# Patient Record
Sex: FEMALE | Race: BLACK OR AFRICAN AMERICAN | Employment: UNEMPLOYED | URBAN - METROPOLITAN AREA
[De-identification: names, ages, dates, MRNs, and addresses within clinical notes are randomized per-mention and may not be internally consistent; named-entity substitution may affect disease eponyms.]

---

## 2019-06-05 ENCOUNTER — APPOINTMENT (OUTPATIENT)
Dept: GENERAL RADIOLOGY | Age: 19
DRG: 566 | End: 2019-06-05
Payer: MEDICAID

## 2019-06-05 ENCOUNTER — HOSPITAL ENCOUNTER (INPATIENT)
Age: 19
LOS: 1 days | Discharge: HOME | DRG: 566 | End: 2019-06-07
Attending: EMERGENCY MEDICINE | Admitting: PODIATRIST
Payer: MEDICAID
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
## 2019-06-05 DIAGNOSIS — Z33.1 PREGNANCY AS INCIDENTAL FINDING: ICD-10-CM

## 2019-06-05 DIAGNOSIS — S81.812A LACERATION OF LEFT LOWER EXTREMITY, INITIAL ENCOUNTER: Primary | ICD-10-CM

## 2019-06-05 DIAGNOSIS — S91.059A DOG BITE OF ANKLE, INITIAL ENCOUNTER: ICD-10-CM

## 2019-06-05 LAB
A/G RATIO: 1.3 (ref 1.1–2.2)
ALBUMIN SERPL-MCNC: 3.9 G/DL (ref 3.4–5)
ALP BLD-CCNC: 60 U/L (ref 40–129)
ALT SERPL-CCNC: 14 U/L (ref 10–40)
ANION GAP SERPL CALCULATED.3IONS-SCNC: 12 MMOL/L (ref 3–16)
AST SERPL-CCNC: 16 U/L (ref 15–37)
BASOPHILS ABSOLUTE: 0 K/UL (ref 0–0.2)
BASOPHILS RELATIVE PERCENT: 0.1 %
BILIRUB SERPL-MCNC: <0.2 MG/DL (ref 0–1)
BUN BLDV-MCNC: 6 MG/DL (ref 7–20)
CALCIUM SERPL-MCNC: 9.5 MG/DL (ref 8.3–10.6)
CHLORIDE BLD-SCNC: 106 MMOL/L (ref 99–110)
CO2: 21 MMOL/L (ref 21–32)
CREAT SERPL-MCNC: <0.5 MG/DL (ref 0.6–1.1)
EOSINOPHILS ABSOLUTE: 0 K/UL (ref 0–0.6)
EOSINOPHILS RELATIVE PERCENT: 0.2 %
GFR AFRICAN AMERICAN: >60
GFR NON-AFRICAN AMERICAN: >60
GLOBULIN: 3 G/DL
GLUCOSE BLD-MCNC: 99 MG/DL (ref 70–99)
GONADOTROPIN, CHORIONIC (HCG) QUANT: NORMAL MIU/ML
HCG QUALITATIVE: POSITIVE
HCT VFR BLD CALC: 37.5 % (ref 36–48)
HEMOGLOBIN: 12.3 G/DL (ref 12–16)
LYMPHOCYTES ABSOLUTE: 1.6 K/UL (ref 1–5.1)
LYMPHOCYTES RELATIVE PERCENT: 10.7 %
MCH RBC QN AUTO: 28.2 PG (ref 26–34)
MCHC RBC AUTO-ENTMCNC: 32.8 G/DL (ref 31–36)
MCV RBC AUTO: 86.1 FL (ref 80–100)
MONOCYTES ABSOLUTE: 0.6 K/UL (ref 0–1.3)
MONOCYTES RELATIVE PERCENT: 3.9 %
NEUTROPHILS ABSOLUTE: 12.6 K/UL (ref 1.7–7.7)
NEUTROPHILS RELATIVE PERCENT: 85.1 %
PDW BLD-RTO: 16.9 % (ref 12.4–15.4)
PLATELET # BLD: 240 K/UL (ref 135–450)
PMV BLD AUTO: 9.1 FL (ref 5–10.5)
POTASSIUM REFLEX MAGNESIUM: 4.1 MMOL/L (ref 3.5–5.1)
RBC # BLD: 4.36 M/UL (ref 4–5.2)
SODIUM BLD-SCNC: 139 MMOL/L (ref 136–145)
TOTAL PROTEIN: 6.9 G/DL (ref 6.4–8.2)
WBC # BLD: 14.8 K/UL (ref 4–11)

## 2019-06-05 PROCEDURE — 80053 COMPREHEN METABOLIC PANEL: CPT

## 2019-06-05 PROCEDURE — 96361 HYDRATE IV INFUSION ADD-ON: CPT

## 2019-06-05 PROCEDURE — 2580000003 HC RX 258

## 2019-06-05 PROCEDURE — 96376 TX/PRO/DX INJ SAME DRUG ADON: CPT

## 2019-06-05 PROCEDURE — 85025 COMPLETE CBC W/AUTO DIFF WBC: CPT

## 2019-06-05 PROCEDURE — 6360000002 HC RX W HCPCS: Performed by: STUDENT IN AN ORGANIZED HEALTH CARE EDUCATION/TRAINING PROGRAM

## 2019-06-05 PROCEDURE — 73610 X-RAY EXAM OF ANKLE: CPT

## 2019-06-05 PROCEDURE — 99284 EMERGENCY DEPT VISIT MOD MDM: CPT

## 2019-06-05 PROCEDURE — 2580000003 HC RX 258: Performed by: STUDENT IN AN ORGANIZED HEALTH CARE EDUCATION/TRAINING PROGRAM

## 2019-06-05 PROCEDURE — 96365 THER/PROPH/DIAG IV INF INIT: CPT

## 2019-06-05 PROCEDURE — 84702 CHORIONIC GONADOTROPIN TEST: CPT

## 2019-06-05 PROCEDURE — 84703 CHORIONIC GONADOTROPIN ASSAY: CPT

## 2019-06-05 PROCEDURE — 6360000002 HC RX W HCPCS: Performed by: EMERGENCY MEDICINE

## 2019-06-05 PROCEDURE — 2580000003 HC RX 258: Performed by: EMERGENCY MEDICINE

## 2019-06-05 PROCEDURE — 96375 TX/PRO/DX INJ NEW DRUG ADDON: CPT

## 2019-06-05 RX ORDER — FENTANYL CITRATE 50 UG/ML
50 INJECTION, SOLUTION INTRAMUSCULAR; INTRAVENOUS
Status: COMPLETED | OUTPATIENT
Start: 2019-06-05 | End: 2019-06-05

## 2019-06-05 RX ORDER — SODIUM CHLORIDE 9 MG/ML
INJECTION, SOLUTION INTRAVENOUS
Status: COMPLETED
Start: 2019-06-05 | End: 2019-06-05

## 2019-06-05 RX ORDER — MORPHINE SULFATE 4 MG/ML
4 INJECTION, SOLUTION INTRAMUSCULAR; INTRAVENOUS ONCE
Status: COMPLETED | OUTPATIENT
Start: 2019-06-05 | End: 2019-06-05

## 2019-06-05 RX ORDER — 0.9 % SODIUM CHLORIDE 0.9 %
2000 INTRAVENOUS SOLUTION INTRAVENOUS ONCE
Status: COMPLETED | OUTPATIENT
Start: 2019-06-05 | End: 2019-06-05

## 2019-06-05 RX ADMIN — AMPICILLIN SODIUM AND SULBACTAM SODIUM 3 G: 2; 1 INJECTION, POWDER, FOR SOLUTION INTRAMUSCULAR; INTRAVENOUS at 20:19

## 2019-06-05 RX ADMIN — SODIUM CHLORIDE 250 ML: 9 INJECTION, SOLUTION INTRAVENOUS at 23:22

## 2019-06-05 RX ADMIN — FENTANYL CITRATE 50 MCG: 50 INJECTION INTRAMUSCULAR; INTRAVENOUS at 19:40

## 2019-06-05 RX ADMIN — MORPHINE SULFATE 4 MG: 4 INJECTION, SOLUTION INTRAMUSCULAR; INTRAVENOUS at 23:22

## 2019-06-05 RX ADMIN — PIPERACILLIN SODIUM,TAZOBACTAM SODIUM 3.38 G: 3; .375 INJECTION, POWDER, FOR SOLUTION INTRAVENOUS at 23:21

## 2019-06-05 RX ADMIN — FENTANYL CITRATE 50 MCG: 50 INJECTION INTRAMUSCULAR; INTRAVENOUS at 21:22

## 2019-06-05 RX ADMIN — SODIUM CHLORIDE 2000 ML: 9 INJECTION, SOLUTION INTRAVENOUS at 19:39

## 2019-06-05 ASSESSMENT — PAIN DESCRIPTION - LOCATION
LOCATION: LEG
LOCATION: LEG

## 2019-06-05 ASSESSMENT — PAIN SCALES - GENERAL
PAINLEVEL_OUTOF10: 10
PAINLEVEL_OUTOF10: 6
PAINLEVEL_OUTOF10: 10

## 2019-06-05 ASSESSMENT — PAIN DESCRIPTION - PAIN TYPE
TYPE: ACUTE PAIN
TYPE: ACUTE PAIN

## 2019-06-05 ASSESSMENT — PAIN DESCRIPTION - ORIENTATION
ORIENTATION: LEFT;LOWER
ORIENTATION: LOWER;LEFT

## 2019-06-05 ASSESSMENT — PAIN DESCRIPTION - FREQUENCY: FREQUENCY: CONTINUOUS

## 2019-06-05 NOTE — ED PROVIDER NOTES
Emergency Physician Note    Chief Complaint  Animal Bite       History of Present Illness  Jose Dugan is a 19 y.o. female who presents to the ED for left ankle animal bite. Patient was attacked by a large dog, she does not know the owner of the dog and she can just give me the prescription or the dog other than that it is very large. Initially she was being brought by ambulance but she did not like the way they were treating her and she refused the IV placement and so she decided to go home and wait for her mom. Her mom ended up bringing her to the ER. Therefore the bite occurred approximately 3 hours prior to arrival.  Patient states she is having this significant sensation loss to her left foot she cannot move her left foot and she cannot tell me if this because it too painful and she does not physically able to move her left foot. Patient is on the Depo-Provera shot. Denies fever, chills, malaise, chest pain, shortness of breath, cough, abdominal pain, nausea, vomiting, diarrhea, headache, sore throat, dysuria, back pain, rash. No palliative/provocative factors. Unless otherwise stated in this report or unable to obtain because of the patient's clinical or mental status as evidenced by the medical record, this patient's positive and negative responses for review of systems, constitutional, psych, eyes, ENT, cardiovascular, respiratory, gastrointestinal, neurological, genitourinary, musculoskeletal, integument systems and systems related to the presenting problem are either stated in the preceding paragraph or were not pertinent or were negative for the symptoms and/or complaints related to the medical problem. I have reviewed the following from the nursing documentation:      Prior to Admission medications    Medication Sig Start Date End Date Taking?  Authorizing Provider   ondansetron (ZOFRAN ODT) 4 MG disintegrating tablet Take 1 tablet by mouth every 8 hours as needed for Nausea 3/1/18 reviewed. ED Triage Vitals [06/05/19 1856]   Enc Vitals Group      BP (!) 116/57      Pulse 107      Resp 18      Temp 98.9 °F (37.2 °C)      Temp Source Oral      SpO2 98 %      Weight 168 lb (76.2 kg)      Height 5' 3\" (1.6 m)      Head Circumference       Peak Flow       Pain Score       Pain Loc       Pain Edu? Excl. in [address]? GENERAL:  Awake, alert. Well developed, well nourished with apparent distress. HENT:  Normocephalic, Atraumatic, no lacerations. EYES:  Conjunctiva normal, Pupils equal round and reactive to light, extraocular movements normal.  NECK:  Trachea is midline. No stridor. CHEST:  Regular rate and regular rhythm, no murmurs/rubs/gallops, normal S1/S2, chest wall non-tender. LUNGS:  No respiratory distress. No abdominal retractions, no sternal retractions. Clear to auscultation bilaterally, no wheezing, no rhochi, no rales  ABDOMEN:  Soft, non-tender, non-distended. No guarding and no rebound. No costovertebral angle tenderness to palpation. Normal BS, no organomegaly, no abdominal masses  EXTREMITIES:  Bilateral upper extremity and right lower extremity: Normal range of motion, no edema, no tenderness, no deformity, distal pulses present and equal bilaterally. Moves extremities with purpose. Left lower extremity, there are 2 large lacerations that are more than skin deep possibly affecting the muscle and the tendon, unable to get a good view of the tendon secondary to pain in the patient's refusal to cooperate. The 2 lacerations are nearly circumferential on the lower distal portion of the leg. She does have good pedal pulses in her left foot                    NEUROLOGIC: Normal mental status. Moving all extremities to command. Alert and oriented x4 without focal motor deficit or gross sensory deficit. Normal speech. Decreased sensation in the left foot.     PSYCHIATRIC: anxious, normal mood and affect, thoughts are linear and organized, without delusions/hallucinations, responds appropriately to questions      LABS and DIAGNOSTIC RESULTS    RADIOLOGY  X-RAYS:  I have reviewed radiologic plain film image(s). ALL OTHER NON-PLAIN FILM IMAGES SUCH AS CT, ULTRASOUND AND MRI HAVE BEEN READ BY THE RADIOLOGIST.   XR ANKLE LEFT (2 VIEWS)    (Results Pending)        LABS  Results for orders placed or performed during the hospital encounter of 06/05/19   CBC Auto Differential   Result Value Ref Range    WBC 14.8 (H) 4.0 - 11.0 K/uL    RBC 4.36 4.00 - 5.20 M/uL    Hemoglobin 12.3 12.0 - 16.0 g/dL    Hematocrit 37.5 36.0 - 48.0 %    MCV 86.1 80.0 - 100.0 fL    MCH 28.2 26.0 - 34.0 pg    MCHC 32.8 31.0 - 36.0 g/dL    RDW 16.9 (H) 12.4 - 15.4 %    Platelets 487 059 - 842 K/uL    MPV 9.1 5.0 - 10.5 fL    Neutrophils % 85.1 %    Lymphocytes % 10.7 %    Monocytes % 3.9 %    Eosinophils % 0.2 %    Basophils % 0.1 %    Neutrophils # 12.6 (H) 1.7 - 7.7 K/uL    Lymphocytes # 1.6 1.0 - 5.1 K/uL    Monocytes # 0.6 0.0 - 1.3 K/uL    Eosinophils # 0.0 0.0 - 0.6 K/uL    Basophils # 0.0 0.0 - 0.2 K/uL   Comprehensive Metabolic Panel w/ Reflex to MG   Result Value Ref Range    Sodium 139 136 - 145 mmol/L    Potassium reflex Magnesium 4.1 3.5 - 5.1 mmol/L    Chloride 106 99 - 110 mmol/L    CO2 21 21 - 32 mmol/L    Anion Gap 12 3 - 16    Glucose 99 70 - 99 mg/dL    BUN 6 (L) 7 - 20 mg/dL    CREATININE <0.5 (L) 0.6 - 1.1 mg/dL    GFR Non-African American >60 >60    GFR African American >60 >60    Calcium 9.5 8.3 - 10.6 mg/dL    Total Protein 6.9 6.4 - 8.2 g/dL    Alb 3.9 3.4 - 5.0 g/dL    Albumin/Globulin Ratio 1.3 1.1 - 2.2    Total Bilirubin <0.2 0.0 - 1.0 mg/dL    Alkaline Phosphatase 60 40 - 129 U/L    ALT 14 10 - 40 U/L    AST 16 15 - 37 U/L    Globulin 3.0 g/dL   HCG Qualitative, Serum   Result Value Ref Range    hCG Qual POSITIVE Detects HCG level >10 MIU/mL   HCG, Quantitative, Pregnancy   Result Value Ref Range    hCG Quant 28533.0 <5.0 mIU/mL       PROCEDURES      MEDICAL DECISION MAKING    Procedures/interventions/images ordered for this visit  Orders Placed This Encounter   Procedures    Please irrigate wound       Medications ordered for this visit  No orders of the defined types were placed in this encounter. Patient is aware of the positive pregnancy test, she cannot tell me when her last menstrual period is. I spoke with Dr. Rajni López, ED Physician and Dr. Triny Dai, podiatry resident. We thoroughly discussed the history, physical exam, laboratory and imaging studies, as well as, current course of treatment within the emergency department. Based upon that discussion, we've decided to transfer Chelsy Hickey to [facility] ED, for further observation and evaluation of Chelsy Hickey current condition. As I have deemed necessary from their history, physical, and studies, I have considered and evaluated Chelsy Hickey for the following diagnoses:      CLINICAL IMPRESSION:  1. Laceration of left lower extremity, initial encounter    2. Dog bite of ankle, initial encounter        BP (!) 114/57   Pulse 90   Temp 98.9 °F (37.2 °C) (Oral)   Resp 18   Ht 5' 3\" (1.6 m)   Wt 76.2 kg (168 lb)   LMP 05/02/2019   SpO2 99%   BMI 29.76 kg/m²       Patient and/or companions verbalized understanding of the ED workup, any relevant findings as well as any incidental findings, and the disposition and plan. Questions sought and answered with the patient and/or family members. Disposition  Pt is in stable condition upon Transfer to [facility] ED. Please note, critical care time was 15 minutes, obtaining history, conducting a physical exam, performing and monitoring interventions, ordering, collecting and interpreting tests, and establishing medical decision-making and discussion with the patient and/or family, specifically for management of the presenting complaint and symptoms initially, direct bedside care, reevaluation, review of records, and consultation.   There was a high probability of clinically significant life-threatening deterioration in the patient's condition, which required my urgent intervention. This time does not include separately billable procedures. The note was completed using Dragon voice recognition transcription. Every effort was made to ensure accuracy; however, inadvertent transcription errors may be present despite my best efforts to edit errors.     Edgar Rosales MD  [address] [place] [address]        Edgar Rosales MD  06/05/19 0028

## 2019-06-05 NOTE — ED NOTES
Cleaned around left ankle laceration/ animal wound with Racheli clens and NaCl, pt tolerated well.   Covered wound with Kerlix and walker boot for transport to [place], [place]  06/05/19 1954

## 2019-06-06 ENCOUNTER — ANESTHESIA (OUTPATIENT)
Dept: OPERATING ROOM | Age: 19
DRG: 566 | End: 2019-06-06
Payer: MEDICAID

## 2019-06-06 ENCOUNTER — ANESTHESIA EVENT (OUTPATIENT)
Dept: OPERATING ROOM | Age: 19
DRG: 566 | End: 2019-06-06
Payer: MEDICAID

## 2019-06-06 VITALS — OXYGEN SATURATION: 89 % | SYSTOLIC BLOOD PRESSURE: 96 MMHG | TEMPERATURE: 96.8 F | DIASTOLIC BLOOD PRESSURE: 51 MMHG

## 2019-06-06 PROBLEM — T14.8XXA ANIMAL BITE: Status: ACTIVE | Noted: 2019-06-06

## 2019-06-06 PROBLEM — S81.802A WOUND OF LEFT LEG, INITIAL ENCOUNTER: Status: ACTIVE | Noted: 2019-06-06

## 2019-06-06 LAB
ABO/RH: NORMAL
AMPHETAMINE SCREEN, URINE: ABNORMAL
ANTIBODY SCREEN: NORMAL
BARBITURATE SCREEN URINE: ABNORMAL
BASOPHILS ABSOLUTE: 0 K/UL (ref 0–0.2)
BASOPHILS RELATIVE PERCENT: 0.2 %
BENZODIAZEPINE SCREEN, URINE: POSITIVE
CANNABINOID SCREEN URINE: ABNORMAL
COCAINE METABOLITE SCREEN URINE: ABNORMAL
EOSINOPHILS ABSOLUTE: 0.1 K/UL (ref 0–0.6)
EOSINOPHILS RELATIVE PERCENT: 0.7 %
GLUCOSE BLD-MCNC: 111 MG/DL (ref 70–99)
HCT VFR BLD CALC: 31.9 % (ref 36–48)
HEMOGLOBIN: 10.6 G/DL (ref 12–16)
INR BLD: 0.97 (ref 0.86–1.14)
LYMPHOCYTES ABSOLUTE: 2.6 K/UL (ref 1–5.1)
LYMPHOCYTES RELATIVE PERCENT: 25.3 %
Lab: ABNORMAL
Lab: NORMAL
MCH RBC QN AUTO: 28.6 PG (ref 26–34)
MCHC RBC AUTO-ENTMCNC: 33.3 G/DL (ref 31–36)
MCV RBC AUTO: 85.9 FL (ref 80–100)
METHADONE SCREEN, URINE: ABNORMAL
MONOCYTES ABSOLUTE: 0.4 K/UL (ref 0–1.3)
MONOCYTES RELATIVE PERCENT: 3.5 %
NEUTROPHILS ABSOLUTE: 7.2 K/UL (ref 1.7–7.7)
NEUTROPHILS RELATIVE PERCENT: 70.3 %
OPIATE SCREEN URINE: POSITIVE
OXYCODONE URINE: ABNORMAL
PDW BLD-RTO: 17.1 % (ref 12.4–15.4)
PERFORMED ON: ABNORMAL
PH UA: 5
PHENCYCLIDINE SCREEN URINE: ABNORMAL
PLATELET # BLD: 197 K/UL (ref 135–450)
PMV BLD AUTO: 9.1 FL (ref 5–10.5)
PROPOXYPHENE SCREEN: ABNORMAL
PROTHROMBIN TIME: 11.1 SEC (ref 9.8–13)
RBC # BLD: 3.72 M/UL (ref 4–5.2)
SEDIMENTATION RATE, ERYTHROCYTE: 23 MM/HR (ref 0–20)
WBC # BLD: 10.2 K/UL (ref 4–11)

## 2019-06-06 PROCEDURE — 2580000003 HC RX 258: Performed by: STUDENT IN AN ORGANIZED HEALTH CARE EDUCATION/TRAINING PROGRAM

## 2019-06-06 PROCEDURE — 6360000002 HC RX W HCPCS: Performed by: STUDENT IN AN ORGANIZED HEALTH CARE EDUCATION/TRAINING PROGRAM

## 2019-06-06 PROCEDURE — 85025 COMPLETE CBC W/AUTO DIFF WBC: CPT

## 2019-06-06 PROCEDURE — 99255 IP/OBS CONSLTJ NEW/EST HI 80: CPT | Performed by: INTERNAL MEDICINE

## 2019-06-06 PROCEDURE — 85610 PROTHROMBIN TIME: CPT

## 2019-06-06 PROCEDURE — 3700000001 HC ADD 15 MINUTES (ANESTHESIA): Performed by: PODIATRIST

## 2019-06-06 PROCEDURE — 7100000001 HC PACU RECOVERY - ADDTL 15 MIN: Performed by: PODIATRIST

## 2019-06-06 PROCEDURE — 6360000002 HC RX W HCPCS: Performed by: NURSE ANESTHETIST, CERTIFIED REGISTERED

## 2019-06-06 PROCEDURE — 6360000002 HC RX W HCPCS: Performed by: EMERGENCY MEDICINE

## 2019-06-06 PROCEDURE — 3700000000 HC ANESTHESIA ATTENDED CARE: Performed by: PODIATRIST

## 2019-06-06 PROCEDURE — 90471 IMMUNIZATION ADMIN: CPT | Performed by: EMERGENCY MEDICINE

## 2019-06-06 PROCEDURE — 2709999900 HC NON-CHARGEABLE SUPPLY: Performed by: PODIATRIST

## 2019-06-06 PROCEDURE — 1200000000 HC SEMI PRIVATE

## 2019-06-06 PROCEDURE — 3600000013 HC SURGERY LEVEL 3 ADDTL 15MIN: Performed by: PODIATRIST

## 2019-06-06 PROCEDURE — 80307 DRUG TEST PRSMV CHEM ANLYZR: CPT

## 2019-06-06 PROCEDURE — 7100000000 HC PACU RECOVERY - FIRST 15 MIN: Performed by: PODIATRIST

## 2019-06-06 PROCEDURE — 86901 BLOOD TYPING SEROLOGIC RH(D): CPT

## 2019-06-06 PROCEDURE — 86900 BLOOD TYPING SEROLOGIC ABO: CPT

## 2019-06-06 PROCEDURE — 36415 COLL VENOUS BLD VENIPUNCTURE: CPT

## 2019-06-06 PROCEDURE — 90375 RABIES IG IM/SC: CPT | Performed by: EMERGENCY MEDICINE

## 2019-06-06 PROCEDURE — 2580000003 HC RX 258: Performed by: PODIATRIST

## 2019-06-06 PROCEDURE — 96376 TX/PRO/DX INJ SAME DRUG ADON: CPT

## 2019-06-06 PROCEDURE — 86850 RBC ANTIBODY SCREEN: CPT

## 2019-06-06 PROCEDURE — 85652 RBC SED RATE AUTOMATED: CPT

## 2019-06-06 PROCEDURE — 0JBR0ZZ EXCISION OF LEFT FOOT SUBCUTANEOUS TISSUE AND FASCIA, OPEN APPROACH: ICD-10-PCS | Performed by: PODIATRIST

## 2019-06-06 PROCEDURE — 6360000002 HC RX W HCPCS: Performed by: ANESTHESIOLOGY

## 2019-06-06 PROCEDURE — 3600000003 HC SURGERY LEVEL 3 BASE: Performed by: PODIATRIST

## 2019-06-06 PROCEDURE — 87205 SMEAR GRAM STAIN: CPT

## 2019-06-06 PROCEDURE — 87070 CULTURE OTHR SPECIMN AEROBIC: CPT

## 2019-06-06 PROCEDURE — 90675 RABIES VACCINE IM: CPT | Performed by: EMERGENCY MEDICINE

## 2019-06-06 RX ORDER — NALOXONE HYDROCHLORIDE 0.4 MG/ML
0.4 INJECTION, SOLUTION INTRAMUSCULAR; INTRAVENOUS; SUBCUTANEOUS PRN
Status: DISCONTINUED | OUTPATIENT
Start: 2019-06-06 | End: 2019-06-07 | Stop reason: HOSPADM

## 2019-06-06 RX ORDER — MIDAZOLAM HYDROCHLORIDE 1 MG/ML
INJECTION INTRAMUSCULAR; INTRAVENOUS PRN
Status: DISCONTINUED | OUTPATIENT
Start: 2019-06-06 | End: 2019-06-06 | Stop reason: SDUPTHER

## 2019-06-06 RX ORDER — LIDOCAINE HYDROCHLORIDE 20 MG/ML
INJECTION, SOLUTION INTRAVENOUS PRN
Status: DISCONTINUED | OUTPATIENT
Start: 2019-06-06 | End: 2019-06-06 | Stop reason: SDUPTHER

## 2019-06-06 RX ORDER — DOCUSATE SODIUM 100 MG/1
100 CAPSULE, LIQUID FILLED ORAL 2 TIMES DAILY PRN
Status: DISCONTINUED | OUTPATIENT
Start: 2019-06-06 | End: 2019-06-07 | Stop reason: HOSPADM

## 2019-06-06 RX ORDER — MORPHINE SULFATE 4 MG/ML
4 INJECTION, SOLUTION INTRAMUSCULAR; INTRAVENOUS ONCE
Status: COMPLETED | OUTPATIENT
Start: 2019-06-06 | End: 2019-06-06

## 2019-06-06 RX ORDER — MORPHINE SULFATE 2 MG/ML
4 INJECTION, SOLUTION INTRAMUSCULAR; INTRAVENOUS
Status: DISCONTINUED | OUTPATIENT
Start: 2019-06-06 | End: 2019-06-07 | Stop reason: HOSPADM

## 2019-06-06 RX ORDER — PROPOFOL 10 MG/ML
INJECTION, EMULSION INTRAVENOUS CONTINUOUS PRN
Status: DISCONTINUED | OUTPATIENT
Start: 2019-06-06 | End: 2019-06-06 | Stop reason: SDUPTHER

## 2019-06-06 RX ORDER — ROPIVACAINE HYDROCHLORIDE 5 MG/ML
INJECTION, SOLUTION EPIDURAL; INFILTRATION; PERINEURAL PRN
Status: DISCONTINUED | OUTPATIENT
Start: 2019-06-06 | End: 2019-06-06 | Stop reason: SDUPTHER

## 2019-06-06 RX ORDER — PROPOFOL 10 MG/ML
INJECTION, EMULSION INTRAVENOUS PRN
Status: DISCONTINUED | OUTPATIENT
Start: 2019-06-06 | End: 2019-06-06 | Stop reason: SDUPTHER

## 2019-06-06 RX ORDER — ONDANSETRON 2 MG/ML
4 INJECTION INTRAMUSCULAR; INTRAVENOUS EVERY 6 HOURS PRN
Status: DISCONTINUED | OUTPATIENT
Start: 2019-06-06 | End: 2019-06-07 | Stop reason: HOSPADM

## 2019-06-06 RX ORDER — ROPIVACAINE HYDROCHLORIDE 5 MG/ML
INJECTION, SOLUTION EPIDURAL; INFILTRATION; PERINEURAL
Status: COMPLETED
Start: 2019-06-06 | End: 2019-06-06

## 2019-06-06 RX ORDER — DIPHENHYDRAMINE HYDROCHLORIDE 50 MG/ML
25 INJECTION INTRAMUSCULAR; INTRAVENOUS EVERY 6 HOURS PRN
Status: DISCONTINUED | OUTPATIENT
Start: 2019-06-06 | End: 2019-06-07 | Stop reason: HOSPADM

## 2019-06-06 RX ORDER — MAGNESIUM HYDROXIDE 1200 MG/15ML
LIQUID ORAL CONTINUOUS PRN
Status: COMPLETED | OUTPATIENT
Start: 2019-06-06 | End: 2019-06-06

## 2019-06-06 RX ORDER — MORPHINE SULFATE 2 MG/ML
2 INJECTION, SOLUTION INTRAMUSCULAR; INTRAVENOUS
Status: DISCONTINUED | OUTPATIENT
Start: 2019-06-06 | End: 2019-06-07 | Stop reason: HOSPADM

## 2019-06-06 RX ORDER — POVIDONE-IODINE 10 MG/G
OINTMENT TOPICAL PRN
Status: DISCONTINUED | OUTPATIENT
Start: 2019-06-06 | End: 2019-06-06 | Stop reason: HOSPADM

## 2019-06-06 RX ORDER — ACETAMINOPHEN 500 MG
500 TABLET ORAL EVERY 6 HOURS PRN
Status: DISCONTINUED | OUTPATIENT
Start: 2019-06-06 | End: 2019-06-07 | Stop reason: HOSPADM

## 2019-06-06 RX ORDER — SODIUM CHLORIDE 9 MG/ML
INJECTION, SOLUTION INTRAVENOUS CONTINUOUS
Status: DISCONTINUED | OUTPATIENT
Start: 2019-06-06 | End: 2019-06-07

## 2019-06-06 RX ADMIN — PROPOFOL 50 MG: 10 INJECTION, EMULSION INTRAVENOUS at 12:26

## 2019-06-06 RX ADMIN — PROPOFOL 50 MG: 10 INJECTION, EMULSION INTRAVENOUS at 12:21

## 2019-06-06 RX ADMIN — VANCOMYCIN HYDROCHLORIDE 1250 MG: 10 INJECTION, POWDER, LYOPHILIZED, FOR SOLUTION INTRAVENOUS at 20:37

## 2019-06-06 RX ADMIN — SODIUM CHLORIDE: 900 INJECTION, SOLUTION INTRAVENOUS at 12:41

## 2019-06-06 RX ADMIN — DIPHENHYDRAMINE HYDROCHLORIDE 25 MG: 50 INJECTION, SOLUTION INTRAMUSCULAR; INTRAVENOUS at 09:31

## 2019-06-06 RX ADMIN — PHENYLEPHRINE HYDROCHLORIDE 100 MCG: 10 INJECTION, SOLUTION INTRAMUSCULAR; INTRAVENOUS; SUBCUTANEOUS at 13:00

## 2019-06-06 RX ADMIN — PROPOFOL 50 MG: 10 INJECTION, EMULSION INTRAVENOUS at 12:25

## 2019-06-06 RX ADMIN — PROPOFOL 100 MCG/KG/MIN: 10 INJECTION, EMULSION INTRAVENOUS at 12:45

## 2019-06-06 RX ADMIN — DIPHENHYDRAMINE HYDROCHLORIDE 25 MG: 50 INJECTION, SOLUTION INTRAMUSCULAR; INTRAVENOUS at 09:30

## 2019-06-06 RX ADMIN — PHENYLEPHRINE HYDROCHLORIDE 100 MCG: 10 INJECTION, SOLUTION INTRAMUSCULAR; INTRAVENOUS; SUBCUTANEOUS at 13:15

## 2019-06-06 RX ADMIN — VANCOMYCIN HYDROCHLORIDE 1500 MG: 10 INJECTION, POWDER, LYOPHILIZED, FOR SOLUTION INTRAVENOUS at 02:50

## 2019-06-06 RX ADMIN — ROPIVACAINE HYDROCHLORIDE 40 ML: 5 INJECTION, SOLUTION EPIDURAL; INFILTRATION; PERINEURAL at 12:28

## 2019-06-06 RX ADMIN — SODIUM CHLORIDE: 900 INJECTION, SOLUTION INTRAVENOUS at 04:26

## 2019-06-06 RX ADMIN — PIPERACILLIN SODIUM,TAZOBACTAM SODIUM 3.38 G: 3; .375 INJECTION, POWDER, FOR SOLUTION INTRAVENOUS at 05:28

## 2019-06-06 RX ADMIN — PROPOFOL 50 MG: 10 INJECTION, EMULSION INTRAVENOUS at 12:23

## 2019-06-06 RX ADMIN — PHENYLEPHRINE HYDROCHLORIDE 100 MCG: 10 INJECTION, SOLUTION INTRAMUSCULAR; INTRAVENOUS; SUBCUTANEOUS at 13:28

## 2019-06-06 RX ADMIN — ROPIVACAINE HYDROCHLORIDE 15 ML: 5 INJECTION, SOLUTION EPIDURAL; INFILTRATION; PERINEURAL at 12:30

## 2019-06-06 RX ADMIN — DIPHENHYDRAMINE HYDROCHLORIDE 25 MG: 50 INJECTION, SOLUTION INTRAMUSCULAR; INTRAVENOUS at 03:46

## 2019-06-06 RX ADMIN — MORPHINE SULFATE 4 MG: 2 INJECTION, SOLUTION INTRAMUSCULAR; INTRAVENOUS at 09:23

## 2019-06-06 RX ADMIN — ENOXAPARIN SODIUM 40 MG: 40 INJECTION SUBCUTANEOUS at 15:56

## 2019-06-06 RX ADMIN — VANCOMYCIN HYDROCHLORIDE 1250 MG: 10 INJECTION, POWDER, LYOPHILIZED, FOR SOLUTION INTRAVENOUS at 12:45

## 2019-06-06 RX ADMIN — PIPERACILLIN SODIUM,TAZOBACTAM SODIUM 3.38 G: 3; .375 INJECTION, POWDER, FOR SOLUTION INTRAVENOUS at 15:48

## 2019-06-06 RX ADMIN — RABIES VACCINE 1 ML: KIT at 01:17

## 2019-06-06 RX ADMIN — PROPOFOL 50 MG: 10 INJECTION, EMULSION INTRAVENOUS at 12:27

## 2019-06-06 RX ADMIN — LIDOCAINE HYDROCHLORIDE 50 MG: 20 INJECTION, SOLUTION INTRAVENOUS at 12:49

## 2019-06-06 RX ADMIN — MIDAZOLAM HYDROCHLORIDE 2 MG: 2 INJECTION, SOLUTION INTRAMUSCULAR; INTRAVENOUS at 12:53

## 2019-06-06 RX ADMIN — RABIES IMMUNE GLOBULIN (HUMAN) 1530 UNITS: 300 INJECTION, SOLUTION INFILTRATION; INTRAMUSCULAR at 01:10

## 2019-06-06 RX ADMIN — MORPHINE SULFATE 4 MG: 4 INJECTION, SOLUTION INTRAMUSCULAR; INTRAVENOUS at 02:49

## 2019-06-06 ASSESSMENT — PULMONARY FUNCTION TESTS
PIF_VALUE: 0
PIF_VALUE: 1
PIF_VALUE: 0

## 2019-06-06 ASSESSMENT — ENCOUNTER SYMPTOMS
ROS SKIN COMMENTS: SEE HPI
RESPIRATORY NEGATIVE: 1
EYES NEGATIVE: 1
GASTROINTESTINAL NEGATIVE: 1

## 2019-06-06 ASSESSMENT — PAIN SCALES - GENERAL
PAINLEVEL_OUTOF10: 10
PAINLEVEL_OUTOF10: 0
PAINLEVEL_OUTOF10: 10
PAINLEVEL_OUTOF10: 9

## 2019-06-06 ASSESSMENT — LIFESTYLE VARIABLES: SMOKING_STATUS: 1

## 2019-06-06 ASSESSMENT — PAIN DESCRIPTION - LOCATION: LOCATION: COCCYX;LEG

## 2019-06-06 ASSESSMENT — PAIN DESCRIPTION - ONSET: ONSET: ON-GOING

## 2019-06-06 ASSESSMENT — PAIN DESCRIPTION - FREQUENCY: FREQUENCY: CONTINUOUS

## 2019-06-06 ASSESSMENT — PAIN DESCRIPTION - ORIENTATION: ORIENTATION: LOWER;LEFT

## 2019-06-06 ASSESSMENT — PAIN DESCRIPTION - PAIN TYPE
TYPE: ACUTE PAIN
TYPE: ACUTE PAIN;SURGICAL PAIN
TYPE: ACUTE PAIN

## 2019-06-06 NOTE — PROGRESS NOTES
Zosyn 3.375g IV q6h over 30 mins ordered for patient. Will change to Zosyn 3.375 over 30 mins x1 then will start extended infusion Zosyn 3.375g IV q8h over 4 hours per policy. CrCl cannot be calculated (This lab value cannot be used to calculate CrCl because it is not a number: <0.5). Pharmacy will continue to monitor renal function and adjust dose as necessary. Please call with any questions. Thanks!   Wen De Leon, PharmD  [phone]  6/5/2019 10:56 PM

## 2019-06-06 NOTE — PROGRESS NOTES
Pt alert and oriented. VSS. Pt arrived to unit and admitted to rm 5311, mother at bedside. Pt upset with ED experience. Pt c/o itching, benadryl given with some relief. Call light with in reach. Bed is in the lowest position. Bed alarm in place.  Will continue to monitor

## 2019-06-06 NOTE — CONSULTS
Department of Podiatry Consult Note  Resident       Reason for Consult:  Dog bite left leg with loss of movement   Requesting Physician:  MD Enrico     CHIEF COMPLAINT:  Bit by unknown dog left leg     HISTORY OF PRESENT ILLNESS:                The patient is a 19 y.o. female with significant past medical history of pregnancy at 6wks who is consulted for lacerations to her left leg. Patient states at 4pm today she was walking her dog when her dog was attacked by another dog, she tried to break up the fight and the other dog got ahold of her left ankle. She states since the injury she cannot move her ankle and has numbness on the lateral side. She expresses it is very painful, and that she is not concerned for her pregnancy, only for her own injury at this time. Denies any recent f/c/n/v or other signs of illness. Past Medical History:    History reviewed. No pertinent past medical history. Past Surgical History:    History reviewed. No pertinent surgical history. Current Medications:    Current Facility-Administered Medications: rabies vaccine, PCEC (RABAVERT) injection 1 mL, 1 mL, Intramuscular, Once  rabies immune globulin (HYPERRAB) 1500 UNIT/5ML injection 1,530 Units, 20 Units/kg, Intramuscular, Once  Allergies:   Patient has no known allergies. Social History:    Smoker daily. Family History:   History reviewed. No pertinent family history.   REVIEW OF SYSTEMS:    See HPI   PHYSICAL EXAM:      Vitals:    BP (!) 114/57   Pulse 90   Temp 98.9 °F (37.2 °C) (Oral)   Resp 18   Ht 5' 3\" (1.6 m)   Wt 168 lb (76.2 kg)   LMP 05/02/2019   SpO2 99%   BMI 29.76 kg/m²     LABS:   Recent Labs     06/05/19 1938   WBC 14.8*   HGB 12.3   HCT 37.5        Recent Labs     06/05/19 1938      K 4.1      CO2 21   BUN 6*   CREATININE <0.5*     Recent Labs     06/05/19 1938   PROT 6.9       VASCULAR: DP is palpable 2/4, and PT pulse is palpable at 1/4 , CFT is brisk to the digits of the foot b/l. Skin temperature is warm to cool from proximal to distal with no focal calor noted. No edema noted. No pain with calf compression b/l. NEUROLOGIC: Gross and epicritic sensation is intact b/l except to lateral ankle of left foot. Protective sensation is appreciated at all pedal sites b/l. DERMATOLOGIC: Nails 1-5 b/l are within normal limits of length, thickness, and color. Webspaces 1-4 b/l are clean, dry, and intact. No hyperkeratosis noted. There is a laceration down to bone on anterior medial ankle approximately 10cm proximal to the ankle joint, another on the anterior lateral at about the same level, and a third posterior lateral slightly more proximal.                       MUSCULOSKELETAL: Muscle strength is 3-5 /5 for all pedal groups tested. Notable weakness in eversion, dorsiflexion, inversion, and plantar flexion. IMPRESSION/RECOMMENDATIONS:      1. Dog Bite left leg     2. Achilles tendon laceration left leg     3. Peroneal tendons laceration left leg     4. Anterior Tibial tendon laceration left leg     5. Sural nerve laceration left leg     6. Pregnancy    - Patient seen bedside in the ED, discussed risks and benefits of surgery today for laceration to tendons.   - HCG + with quant of over 70,000 approx 6wk pregnant. - xrays negative for fx   - ordered vanc and zosyn   - ordered rabies immunoglobulin and immunization to be started. - plan to take to OR for I&D with achilles and peroneal tendon repair with closure. - admit for observation post op. Thank you for the opportunity to take part in the patient's care. - The patient will be staffed with Dr. Jovan Calhoun, DPM   Podiatric Resident, PGY-2  Pager: [phone]  6/5/2019, 10:02 PM    Addendum: Patient started accusing this provider and the nurse of being rude and weird and refused to be treated, will be signing out AMA.

## 2019-06-06 NOTE — ANESTHESIA PRE PROCEDURE
06/06/2019       CMP:   Lab Results   Component Value Date     06/05/2019    K 4.1 06/05/2019     06/05/2019    CO2 21 06/05/2019    BUN 6 06/05/2019    CREATININE <0.5 06/05/2019    GFRAA >60 06/05/2019    AGRATIO 1.3 06/05/2019    LABGLOM >60 06/05/2019    GLUCOSE 99 06/05/2019    PROT 6.9 06/05/2019    CALCIUM 9.5 06/05/2019    BILITOT <0.2 06/05/2019    ALKPHOS 60 06/05/2019    AST 16 06/05/2019    ALT 14 06/05/2019       POC Tests: No results for input(s): POCGLU, POCNA, POCK, POCCL, POCBUN, POCHEMO, POCHCT in the last 72 hours. Coags:   Lab Results   Component Value Date    PROTIME 11.1 06/06/2019    INR 0.97 06/06/2019       HCG (If Applicable):   Lab Results   Component Value Date    PREGTESTUR Negative 03/28/2018        ABGs: No results found for: PHART, PO2ART, FBU2YTY, TSU5UNW, BEART, W1LJVAPO     Type & Screen (If Applicable):  No results found for: LABABO, LABRH    Anesthesia Evaluation   no history of anesthetic complications:   Airway: Mallampati: II  TM distance: >3 FB   Neck ROM: full  Mouth opening: > = 3 FB Dental: normal exam         Pulmonary:Negative Pulmonary ROS breath sounds clear to auscultation  (+) current smoker                           Cardiovascular:Negative CV ROS            Rhythm: regular  Rate: normal                    Neuro/Psych:   Negative Neuro/Psych ROS              GI/Hepatic/Renal: Neg GI/Hepatic/Renal ROS            Endo/Other: Negative Endo/Other ROS                    Abdominal:           Vascular: negative vascular ROS. I had a long discussion had with pt and her mother re risk/benfits of GA with pt in first trimester of pregnancy. Pt is aware of risks and wishes to proceed. We will try to avoid GA and instead proceed with LE block with propofol only for sedation and MAC with propofol for the case. Anesthesia Plan      MAC and regional     ASA 3 - emergent       Induction: intravenous.     MIPS: Postoperative opioids intended and Prophylactic antiemetics administered. Anesthetic plan and risks discussed with patient and mother. Plan discussed with CRNA.                   Micaela Yee DO   6/6/2019

## 2019-06-06 NOTE — PROGRESS NOTES
Patient arrived back from PACU, alert and oriented, vitals stable. Patient denies pain. Reviewed NWB LLE status, patient agreeable. General diet ordered, tolerating. Will monitor.

## 2019-06-06 NOTE — PROGRESS NOTES
patient agree. He was completely supportive of my decision and felt that despite the mother not caring about the fetus (as she stated herself), we should do whatever is necessary to avoid putting the fetus at risk. I relayed this information to Dr Jin Barboza and he agreed that he could safely do the case with just a nerve block and nothing else. He is going to speak with the patient and offer her this option. Of note, when she was brought up to the [address] [facility] (who transported the patient) mentioned that she went over several bumps on the way and apologized to the patient, who replied that she could not feel anything & it did not hurt because her foot was numb. When I spoke with her, she did not grimace in pain, she was not in tears, and she appeared to be resting comfortably. However, after our discussion she stated that she was in a lot of pain and requested more pain medication. When I told her to ask for medication from the ED, as I am unable to give her any, she stated that the fentanyl they gave her was not working and she needed something else. Dr Jin Barboza and Aramis Tinajero are to speak with the patient. Waiting for further information. Vinh Oscar MD      6/6/19 at 12:20 AM    After agreeing the a popliteal block, Gladis (anestheia tech) was sent down to get the patient with Kiera Mathew (OR nurse). The patient refused to sign the OR consent. She was verbally abusive to the ED Nurse and other members of the staff. She stated that it was her body and her baby and that she did not even want the baby. Her mother tried to calm her down, but it did not help. Furthermore, she received several doses of fentanyl and morphine and is unable to consent at this time. She stated that she did not want the podiatry resident to be a part of the case, which Dr Jin Barboza stated was not an option. Because of all of these issues and potential risks associated with proceeding, the case was cancelled.      I spoke with

## 2019-06-06 NOTE — ED NOTES
OR staff called and requesting consent, Dr. Jennifer Bell made aware and consent given for her to fill out name of procedure.      Tiana Howard RN  06/06/19 0002

## 2019-06-06 NOTE — ED NOTES
Patient and mother made aware patient unable to eat or drink any fluids, asked patient regarding any other metal in hair. Patient has tongue and lip piercing but refusing to take out. Patient refusing to take off PJ pants or undergarments, mother at bedside and aware.      Alina Ball RN  06/05/19 1853

## 2019-06-06 NOTE — BRIEF OP NOTE
Brief Postoperative Note  ______________________________________________________________    Patient: Presley Escalante  YOB: 2000  MRN: [id number]  Date of Procedure: 6/6/2019    Pre-Op Diagnosis: ANIMAL BITE LEFT LEG    Post-Op Diagnosis: Same       Procedure(s):  IRRIGATION OF OPEN WOUND WITH CLOSURE- LEFT LEG    Anesthesia: Regional, Monitor Anesthesia Care    Surgeon(s):  Moises Flaherty DPM    Assistant:   Alberto Link DPM     Estimated Blood Loss (mL): 50    Complications: None    Specimens:   ID Type Source Tests Collected by Time Destination   1 : LEFT LEG WOUND CULTURE Tissue Tissue TISSUE CULTURE Alberto Link DPM 6/6/2019 1352        Implants:  * No implants in log *      Drains:   Open Drain Left;Posterior Leg  (Active)       Findings: No transverse tears to the achilles or peroneal tendons were found, longitudinal tears of achilles and peroneals were noted but not repaired due to contaminated nature of injury. DISPO: S/P I&D left leg closed with penrose drain in place to be pulled Friday am, achilles and peroneal tendons in tact, wounds flushed thoroughly and closed, definitive procedure, pt in posterior/sugar tong splint, non WB to left leg, will f/u outpatient with Dr. Ander Cox in one week.      Hudson Whiting DPM  Date: 6/6/2019  Time: 2:19 PM

## 2019-06-06 NOTE — PROGRESS NOTES
Physical Therapy    Chart review completed. Attempted to see pt at 900 on 6/6/19. Pt declining to participate in session stating, \"I am having surgery. \"  PT offering to assist pt with ambulation to restroom to evaluate her ability to maintain NWB LLE. Pt stating, \"I can't walk right now, I have to skip on my other foot. I'm in too much pain. Every time I try, my leg starts bleeding. \"  Pt and pt's mother stating that they prefer to wait until after the pt's surgery for a PT evaluation. Pt stating that she is currently in 10/10 LLE pain and would like pain medicine. Pt and pt's mother also wondering why her blood was drawn earlier this morning, and if they can speak to the doctor in charge of the pt's care. RN notified of pt wanting pain medicine, education regarding blood draws, and to speak to the doctor. Will re-attempt evaluation as schedule allows. Attempted to see pt again at 1556 on 6/6/19. Per RN, pt has just returned from surgery and requests that eval be held until tomorrow. Will re-attempt eval tomorrow as schedule allows.     Huma Shah, PT, DPT, CLT

## 2019-06-06 NOTE — CONSULTS
or wheezes   Heart: regular rate and rhythm, S1, S2 normal, no murmur,  regular rate and rhythm   Abdomen:soft, non-tender; non-distended absent bowel sounds no masses, no organomegaly  Extremities:LLE DRESSING    Neurologic: Alert and oriented X 3,DATA:  Imaging:    CBC with Differential:    Lab Results   Component Value Date    WBC 10.2 06/06/2019    RBC 3.72 06/06/2019    HGB 10.6 06/06/2019    HCT 31.9 06/06/2019     06/06/2019    MCV 85.9 06/06/2019    LYMPHOPCT 25.3 06/06/2019     BMP:    Lab Results   Component Value Date     06/05/2019    K 4.1 06/05/2019     06/05/2019    CO2 21 06/05/2019    BUN 6 06/05/2019    CREATININE <0.5 06/05/2019    CALCIUM 9.5 06/05/2019    GFRAA >60 06/05/2019    LABGLOM >60 06/05/2019    GLUCOSE 99 06/05/2019     PT/INR:  No results found for: PTINR      ASSESSMENT:         Animal bite,LLE-extensive  Pregnancy. Plan   OR planned  Ct Unasyn  Pain control  Pharmacy to help with safe meds for Pregnancy.     MD LIZETTE

## 2019-06-06 NOTE — ED NOTES
Dr. Ewelina Moncada informed no Unasyn 1.5 gm available, only available 3 gm     Alayne Means  06/05/19 2001

## 2019-06-06 NOTE — ED NOTES
Family extremely upset that they are now being told they cannot get procedure  done tonight when they originally told them it was time sensitive that they take care of this asap.  Advised pt I would speak with MD and see what we could do      Anshul Carvalho RN  06/06/19 4880

## 2019-06-06 NOTE — CONSULTS
Clinical Pharmacy Consult Note    Admit date: 6/5/2019    Subjective/Objective:  18 yo F with PMH pregnancy at 6 weeks who is admitted for lacerations to her left leg d/t dog bite. Planning for I&D with achilles and peroneal tendon repair with closure. Pharmacy is consulted to dose Vancomycin per Dr. Darlene Tran    Pertinent Medications:  Zosyn 3.375g IV q8 hours  Vancomycin -- pharmacy to dose    PERTINENT LABS:  Recent Labs     06/05/19  1938      K 4.1      CO2 21   BUN 6*   CREATININE <0.5*       CrCl cannot be calculated (This lab value cannot be used to calculate CrCl because it is not a number: <0.5). Recent Labs     06/05/19  1938   WBC 14.8*   HGB 12.3   HCT 37.5   MCV 86.1          Height:  5' 3\" (160 cm)  Weight: 168 lb (76.2 kg)        Assessment/Plan:  1. Left leg laceration/dog bite: zosyn + vancomycin  Vancomycin  · Will start vancomycin 1500 mg IV x1 followed by 1250 mg IV q8 hours. Provides ~ 16 mg/kg  · Patient in first trimester of pregnancy. Will aim for lower end of trough goal 15-20 to avoid savage/nephro toxicity in baby. · Clinical pharmacist will follow-up in AM.  · Renal function will be monitored closely and dosing will be adjusted as appropriate. Please call with any questions. Thank you for consulting pharmacy!   Ba Julien RPh 6/6/2019, 5:07 AM

## 2019-06-06 NOTE — ED NOTES
Pt and pt mother very upset with nursing sup stating \"she didn't      Taya Alberto, RN  06/06/19 3733

## 2019-06-06 NOTE — PROGRESS NOTES
Patient left floor for surgery. Consent not signed yet, informed Dr Edel Mchugh and Jacinto Clark in same day surgery, surgery details needed.

## 2019-06-06 NOTE — PROGRESS NOTES
1412 Patient admitted to PACU # 13 from OR at 1412 post [address]- LEFT LEG   per Dr. Wade Crawford. Attached to PACU monitoring system and report received from anesthesia provider. Patient was reported to be hemodynamically stable during procedure. Patient anxious, tearful and uncooperative on arrival. Wants to go back to her room and crying for her mom. Support given but very paranoid as to why so many people here, why is she here etc?  Nurse tried to explain but it only escalated the situation. Mom to bedside, had already gone back to patient's room after speaking with the doctor post op. Patient much more calm briefly and able to get ready to go back to her room. Insists she wants to walk and go home even though reminded non- weight bearing.

## 2019-06-06 NOTE — ANESTHESIA POSTPROCEDURE EVALUATION
Department of Anesthesiology  Postprocedure Note    Patient: Jonas Díaz  MRN: [id number]  YOB: 2000  Date of evaluation: 6/6/2019  Time:  5:02 PM     Procedure Summary     Date:  06/06/19 Room / Location:  [facility] OR 03 / Julieta Zapata OR    Anesthesia Start:  9862 Anesthesia Stop:  5338    Procedures:       IRRIGATION OF OPEN WOUND WITH CLOSURE- LEFT LEG (Left )      LEFT ANKLE DEBRIDEMENT,  INCISION AND DRAINAGE WITH POSSIBLE TENDON REPAIR (canceled) Diagnosis:  (ANIMAL BITE LEFT LEG)    Surgeon:  Linda Bernstein DPM Responsible Provider:  Paty Oden DO    Anesthesia Type:  MAC, regional ASA Status:  3 - Emergent          Anesthesia Type: MAC, regional    Bianka Phase I: Bianka Score: 10    Bianka Phase II:      Last vitals: Reviewed and per EMR flowsheets.        Anesthesia Post Evaluation    Patient location during evaluation: PACU  Patient participation: complete - patient participated  Level of consciousness: awake and alert  Airway patency: patent  Nausea & Vomiting: no nausea and no vomiting  Cardiovascular status: blood pressure returned to baseline  Respiratory status: acceptable  Hydration status: euvolemic

## 2019-06-06 NOTE — ED NOTES
Patient making statements the hospital staff is only not putting her to sleep d/t \"Mosque beliefs\" Patient raising voice and yelling at Dr. Carolann Renteria and self, patient called self RN a \"bitch\" and said \"you can leave\" Charge  [facility] aware.       Sabra Huizar RN  06/06/19 0001

## 2019-06-06 NOTE — ED NOTES
To Surgery [facility] area per Surgery tech to with mother to speak with Anesthesia regarding options for anesthesia      Heath Carrera RN  06/05/19 4360

## 2019-06-06 NOTE — CARE COORDINATION
2019  [facility])  Clinical Case Management Department  Pt form home with parents going to OR today, PT OT evkelvin pending, no needs anticipated  Patient: Pamela Mccullough  MRN: [id number] / : 2000  ACCT: [de-identified]          Admission Documentation  Attending Provider: Marcio Palomino DPM  Admit date/time: 2019  6:48 PM  Status: Inpatient [101]  Diagnosis: Animal bite     Readmission within last 30 days:  no     Living Situation  Discharge Planning  Living Arrangements: Parent  Support Systems: Family Members, Parent  Potential Assistance Needed: N/A  Type of Home Care Services: None  Patient expects to be discharged to[de-identified] home  Expected Discharge Date: 19    Service Assessment       Values / Beliefs  Do you have any ethnic, cultural, sacramental, or spiritual Congregational needs you would like us to be aware of while you are in the hospital?: No    Advance Directives (For Healthcare)  Pre-existing DNR Comfort Care/DNR Arrest/DNI Order: No  Healthcare Directive: No, patient does not have an advance directive for healthcare treatment                        Destination  home with parents    [facility]   none    Home Health/Skilled Nursing  Services at Discharge: None  Home care at home?  No          [address] with parents no needs anticipated at this time   Pharmacy: [facility] meds [facility] beds   Potential Assistance Purchasing Medications:  No  Does patient want to participate in local refill/meds to beds program?: Not Assessed    Goals of Care  Patient expects to be discharged to[de-identified] home  Patient plans for SNF: NA         Mode of transport from hospital: private car with parents     Factors facilitating achievement of predicted outcomes: Family support, Motivated, Cooperative and Pleasant    Barriers to discharge: none noted     Jonna Gurrola RN  [facility]  Case Management Department  Ph: [phone] Fax: [phone]

## 2019-06-06 NOTE — PLAN OF CARE
Problem: Pain:  Description  Pain management should include both nonpharmacologic and pharmacologic interventions. Goal: Pain level will decrease  Description  Pain level will decrease  Outcome: Met This Shift  Note:   Patient denies pain at this time. Popliteal block given earlier today. Encouraged patient to inform staff when pain returns. Goal: Control of acute pain  Description  Control of acute pain  6/6/2019 1617 by Muade Sanchez RN  Outcome: Met This Shift  6/6/2019 0808 by Federica Hsieh RN  Note:   Pain is being control with prn pain meds. Pt encouraged to call out with any pain needs. Will continue to monitor     Problem: Falls - Risk of:  Goal: Will remain free from falls  Description  Will remain free from falls  Outcome: Met This Shift  Note:   Patient's bed in low position, wheels locked. Call light kept in reach. Bed alarm on. Hourly checks on needs. Safety maintained.

## 2019-06-06 NOTE — ED NOTES
Podiatry team in speaking with patient, patient refusing any IM shots for rabies vaccine or immunoglobulin and also refused to have shot for block for surgery. Patients mother at bedside and also aware. Dr. Steinberg President made aware patient refusing shots currently, Pharmacy called.        Humberto Siegel RN  06/05/19 5270

## 2019-06-06 NOTE — CONSULTS
Infectious Diseases Inpatient Consult Note    RESIDENT NOTE - reviewed / edited, attending note at bottom    Reason for Consult:   Animal bite wound on left lower extremity  Requesting Physician:   Dr. Marie Sommer  Primary Care Physician:  No primary care provider on file. History Obtained From:   Pt, EPIC    Admit Date: 6/5/2019  Hospital Day: 2    CHIEF COMPLAINT:       Chief Complaint   Patient presents with    Animal Bite       HISTORY OF PRESENT ILLNESS:    18 yo female with no significant PMHx. Patient was admitted from ED last night since patient was unable to get to OR overnight. Patient was walking her dog and a dog came up and attacked her dog. She was trying to save her dog and it attacked her leg. Patient states that she had no idea who's dog it was. Patient states that she has had a rabies vaccine. She had surgery today: I&D with wound closure. Patient denies any pain at this time. She denies any f/c/v/sob/cp. WBC 10.2; ESR 23; vss    Past Medical History:    History reviewed. No pertinent past medical history. Past Surgical History:    History reviewed. No pertinent surgical history. Current Medications:     vancomycin  1,250 mg Intravenous Q8H    piperacillin-tazobactam  3.375 g Intravenous Q8H       Allergies:  Patient has no known allergies. Social:    Daily Smoker. Admits to drinking alcohol  6 weeks pregnant. Patient lives at home     Family History:   No immunodeficiency    REVIEW OF SYSTEMS:    No fever / chills / sweats. No weight loss. No visual change, eye pain, eye discharge. No oral lesion, sore throat, dysphagia. Denies cough / sputum. Denies chest pain, palpitations. Denies n / v / abd pain. No diarrhea. Denies dysuria or change in urinary function. Denies joint swelling or pain. No myalgia, arthralgia.   Denies skin changes, itching  Denies focal weakness, sensory change or other neurologic symptom    Denies new / worse depression, psychiatric symptoms  Denies any Endocrine or Hematologic symptoms    PHYSICAL EXAM:      Vitals:    /73   Pulse 90   Temp 98.3 °F (36.8 °C) (Oral)   Resp 16   Ht 5' 3\" (1.6 m)   Wt 168 lb (76.2 kg)   LMP 05/02/2019   SpO2 100%   BMI 29.76 kg/m²     GENERAL: No apparent distress. HEENT: Membranes moist, no oral lesion  NECK:  Supple  LUNGS: Clear b/l, no rales, no dullness  CARDIAC: RRR, no murmur appreciated  ABD:  + BS, soft / NT  EXT:   Deep wounds with tendon/muscle exposure to the LLE   NEURO: No focal neurologic findings  PSYCH: Orientation, sensorium, mood normal  LINES:  Peripheral iv                 DATA:    Lab Results   Component Value Date    WBC 10.2 06/06/2019    HGB 10.6 (L) 06/06/2019    HCT 31.9 (L) 06/06/2019    MCV 85.9 06/06/2019     06/06/2019     Lab Results   Component Value Date    CREATININE <0.5 (L) 06/05/2019    BUN 6 (L) 06/05/2019     06/05/2019    K 4.1 06/05/2019     06/05/2019    CO2 21 06/05/2019       Hepatic Function Panel:   Lab Results   Component Value Date    ALKPHOS 60 06/05/2019    ALT 14 06/05/2019    AST 16 06/05/2019    PROT 6.9 06/05/2019    BILITOT <0.2 06/05/2019    LABALBU 3.9 06/05/2019       Imaging:   RADIOLOGY:  XR ANKLE LEFT (MIN 3 VIEWS)   Final Result       Soft tissue defect as above. No underlying osseous abnormality.         LABS  WBC trending down from 14.8 to 10.2; ESR 23     IMPRESSION:      Patient Active Problem List   Diagnosis    Blurred vision    Animal bite       ASSESSMENT:     Animal bite wound, left lower extremity     PLAN:   -currently vanc/zosyn  -F/u on surgical culture   -Surgical debridement with Podiatry today. Discussed with Dr. Moises Elmore, DPM    Addendum to Resident Consult note:  Pt seen, examined and evaluated. I have reviewed the current history, physical findings, labs and assessment and plan and agree with note as documented by resident (Dr. Maria L Mccauley).     20 yo woman - + pregnant    Dog bite L ankle, 'attacked'. She was walking her dog and other dog was going after her dog. She was pulling away her dog and dog bite her leg. Presented / admit 6/5 - deep wounds. Afebrile. WBC 14.8. Tox + opiate, benzo. Seen by Podiatry. Refused surg on night of admission. Started on vancomycin + zosyn. OR today 6/6 - I&D with wound closure (penrose placed)  Tissue GS no WBC, no organism    IMP/  Dog bite, deep tissue.      - can consider 'provoked', see above, dog going after another dog   - Tetanus vaccine given 3/29/19    REC/  Cont zosyn + vancomycin for now   F/u on cult    Wound care per podiatry    High risk for wound infection  Not candidate for home iv antibiotics  Home on po augmentin 875 mg bid x 14 days  Close f/u - see podiatry, call ID or podiatry with any change in wound    Discussed with pt and her mother   Francesco Mckeon MD

## 2019-06-06 NOTE — PROGRESS NOTES
PACU Transfer Note    Vitals:    06/06/19 1445   BP: 108/69   Pulse: 88   Resp: 14   Temp: 98.2 °F (36.8 °C)   SpO2: 100%       In: 275 [I.V.:275]  Out: -     Pain assessment:  none  Pain Level: 0    Report given to Receiving unit RENETTA Garduno.     6/6/2019 2:45 PM

## 2019-06-06 NOTE — PROGRESS NOTES
Patient alert and oriented. Vitals stable. RFA IV infusing NS at 75 ml/hr with intermittent antibiotics. Left lower leg swollen with ace wrap, cdi, brisk cap refill. Patient complained of pain 10/10, LLE, morphine given with benefit. Reviewed safety and plan of care, verbalized understanding. NPO, waiting for surgery.

## 2019-06-06 NOTE — ED PROVIDER NOTES
AST 16 15 - 37 U/L    Globulin 3.0 g/dL   HCG Qualitative, Serum   Result Value Ref Range    hCG Qual POSITIVE Detects HCG level >10 MIU/mL   HCG, Quantitative, Pregnancy   Result Value Ref Range    hCG Quant 56516.0 <5.0 mIU/mL     RECENT VITALS:  BP: 118/85,Temp: 99.2 °F (37.3 °C), Pulse: 86, Resp: 18, SpO2: 98 %     Procedures     N/A    ED Course     Nursing Notes, Past Medical Hx, Past Surgical Hx, Social Hx,Allergies, and Family Hx were reviewed. The patient was given the following medications:  Orders Placed This Encounter   Medications    0.9 % sodium chloride IV bolus 2,000 mL    DISCONTD: ampicillin-sulbactam (UNASYN) 1.5 g IVPB minibag    fentaNYL (SUBLIMAZE) injection 50 mcg    ampicillin-sulbactam (UNASYN) 3 g ivpb minibag    rabies vaccine, PCEC (RABAVERT) injection 1 mL    rabies immune globulin (HYPERRAB) 1500 UNIT/5ML injection 1,530 Units    Prenatal Multivit-Min-Fe-FA (PRENATAL VITAMINS) 0.8 MG TABS     Sig: Take 1 tablet by mouth daily     Dispense:  30 tablet     Refill:  8    morphine injection 4 mg    DISCONTD: piperacillin-tazobactam (ZOSYN) 3.375 g in dextrose 5 % 100 mL IVPB (mini-bag)    FOLLOWED BY Linked Order Group     piperacillin-tazobactam (ZOSYN) 3.375 g in dextrose 5 % 100 mL IVPB (mini-bag)     piperacillin-tazobactam (ZOSYN) 3.375 g in dextrose 5 % 100 mL IVPB extended infusion (mini-bag)    sodium chloride 0.9 % infusion     ADA EL: cabinet override    vancomycin (VANCOCIN) 1,500 mg in dextrose 5 % 250 mL IVPB       CONSULTS:  Ghulam Jackson DECISIONMAKING / ASSESSMENT / Christie Jayme is a 19 y.o. female presents as transfer from outside facility after sustaining a dog bite to the left leg. Patient has a laceration present with possible Achilles tendon and sural nerve injury. This will necessitate operative repair by podiatry.   Patient does not know the dog so was written for rabies vaccination series. She was found to be pregnant but has no pregnancy related complaints at this times I will write her for prenatal vitamins to use after discharge. Clinical Impression     1. Laceration of left lower extremity, initial encounter    2. Dog bite of ankle, initial encounter    3. Pregnancy as incidental finding        Disposition     PATIENT REFERRED TO:  No follow-up provider specified. DISCHARGE MEDICATIONS:  Current Discharge Medication List          DISPOSITION Decision To Admit 06/05/2019 10:28:25 PM        Soledad Ty MD  06/05/19 2271    Patient was planned to go to the operating room for repair but initially refused because she would not undergo general anesthesia. Patient did have one 1 discussion with the anesthesiologist about the risks to her pregnancy and that it would need to be done under a nerve block instead. Patient apparently initially refused but then later agreed to it. I discussed this with the podiatry resident but at this point to my understanding anesthesia and the podiatry attending had left the hospital.  After long discussion with podiatry, the patient, and the nursing supervisor the patient was admitted to the hospital on IV antibiotics with plans for operative repair during the day.      Soledad Ty MD  06/06/19 7788

## 2019-06-06 NOTE — PLAN OF CARE
Problem: Pain:  Description  Pain management should include both nonpharmacologic and pharmacologic interventions. Goal: Control of acute pain  Description  Control of acute pain  Note:   Pain is being control with prn pain meds. Pt encouraged to call out with any pain needs.  Will continue to monitor

## 2019-06-06 NOTE — PROGRESS NOTES
Clinical Pharmacy Progress Note    Admit date: 6/5/2019     Subjective/Objective:  Pt is a 16yof who is currently ~6 weeks pregnant who is admitted with left LE achilles tendon laceration, peroneal laceration, and anterior tendon laceration 2/2 dog bite. Interval update:  Planning surgical intervention. Pharmacy is consulted to dose Vancomycin per Dr. Al Villa    Current antibiotics:  Zosyn 3.375g IV EI q8h - day #1  Vancomycin - Pharmacy to dose - day #1   1.5g IV x1 6/6 03:00   1.25g IV q8h (6/6 - current)      Recent Labs     06/05/19  1938      K 4.1      CO2 21   BUN 6*   CREATININE <0.5*   GLUCOSE 99       CrCl cannot be calculated (This lab value cannot be used to calculate CrCl because it is not a number: <0.5). Lab Results   Component Value Date    WBC 10.2 06/06/2019    HGB 10.6 (L) 06/06/2019    HCT 31.9 (L) 06/06/2019    MCV 85.9 06/06/2019     06/06/2019       Lab Results   Component Value Date    PROTIME 11.1 06/06/2019    INR 0.97 06/06/2019       Height:  5' 3\" (160 cm)  Weight:  168 lb (76.2 kg)    Vancomycin Levels:  Trough  6/7 @ 11:00 = pending    Culture results:  None available    Prophylaxis:  VTE:  On hold for procedure  GI:  Not indicated      Assessment/Plan:  1)  Left leg lacerations 2/2 dog bite:  Zosyn + Vancomycin - day #1  · Vancomycin - Pharmacy to dose  · Continue 1.25g IV q8h. · Trough has been ordered with dose due 6/7 11:00. Desired trough ~15. · Clinical condition will be monitored closely, and levels will be ordered & dose adjustments made as appropriate.     Please call with questions--  Thanks--  Teodoro Whittaker, PharmD, [address] [facility], [address] or [phone] (wireless)  6/6/2019 11:26 AM

## 2019-06-06 NOTE — PROGRESS NOTES
[facility] / [facility] ([facility]) [address] [facility], [address]    Acknowledgment of Informed Consent for Surgical or Medical Procedure and Sedation  I agree to allow doctor(s) Suleman Bhatti DPM  and his/her associates or assistants, including residents and/or other qualified medical practitioner to perform the following medical treatment or procedure and to administer or direct the administration of sedation as necessary:  Procedure(s): Incision and drainage of left leg lacerations with tendon repairs   My doctor has explained the following regarding the proposed procedure:   the explanation of the procedure   the benefits of the procedure   the potential problems that might occur during recuperation   the risks and side effects of the procedure which could include but are not limited to severe blood loss, infection, stroke or death   the benefits, risks and side effect of alternative procedures including the consequences of declining this procedure or any alternative procedures   the likelihood of achieving satisfactory results. I acknowledge no guarantee or assurance has been made to me regarding the results. I understand that during the course of this treatment/procedure, unforeseen conditions can occur which require an additional or different procedure. I agree to allow my physician or assistants to perform such extension of the original procedure as they may find necessary. I understand that sedation will often result in temporary impairment of memory and fine motor skills and that sedation can occasionally progress to a state of deep sedation or general anesthesia. I understand the risks of anesthesia for surgery include, but are not limited to, sore throat, hoarseness, injury to face, mouth, or teeth; nausea; headache; injury to blood vessels or nerves; death, brain damage, or paralysis.     I understand that if I have a Limitation of Treatment order in effect during my

## 2019-06-07 VITALS
HEIGHT: 63 IN | OXYGEN SATURATION: 99 % | HEART RATE: 96 BPM | WEIGHT: 168 LBS | SYSTOLIC BLOOD PRESSURE: 104 MMHG | TEMPERATURE: 98.5 F | RESPIRATION RATE: 16 BRPM | BODY MASS INDEX: 29.77 KG/M2 | DIASTOLIC BLOOD PRESSURE: 64 MMHG

## 2019-06-07 PROCEDURE — 6370000000 HC RX 637 (ALT 250 FOR IP): Performed by: STUDENT IN AN ORGANIZED HEALTH CARE EDUCATION/TRAINING PROGRAM

## 2019-06-07 PROCEDURE — 99232 SBSQ HOSP IP/OBS MODERATE 35: CPT | Performed by: INTERNAL MEDICINE

## 2019-06-07 PROCEDURE — 6360000002 HC RX W HCPCS: Performed by: STUDENT IN AN ORGANIZED HEALTH CARE EDUCATION/TRAINING PROGRAM

## 2019-06-07 PROCEDURE — 2580000003 HC RX 258: Performed by: STUDENT IN AN ORGANIZED HEALTH CARE EDUCATION/TRAINING PROGRAM

## 2019-06-07 PROCEDURE — 97161 PT EVAL LOW COMPLEX 20 MIN: CPT

## 2019-06-07 RX ORDER — AMOXICILLIN AND CLAVULANATE POTASSIUM 875; 125 MG/1; MG/1
1 TABLET, FILM COATED ORAL EVERY 12 HOURS SCHEDULED
Status: DISCONTINUED | OUTPATIENT
Start: 2019-06-07 | End: 2019-06-07

## 2019-06-07 RX ORDER — TRAMADOL HYDROCHLORIDE 50 MG/1
50 TABLET ORAL ONCE
Status: COMPLETED | OUTPATIENT
Start: 2019-06-07 | End: 2019-06-07

## 2019-06-07 RX ORDER — AMOXICILLIN AND CLAVULANATE POTASSIUM 875; 125 MG/1; MG/1
1 TABLET, FILM COATED ORAL 2 TIMES DAILY
Qty: 28 TABLET | Refills: 0 | Status: SHIPPED | OUTPATIENT
Start: 2019-06-07 | End: 2019-06-21

## 2019-06-07 RX ORDER — HYDROCODONE BITARTRATE AND ACETAMINOPHEN 5; 325 MG/1; MG/1
1 TABLET ORAL EVERY 4 HOURS PRN
Qty: 18 TABLET | Refills: 0 | Status: SHIPPED | OUTPATIENT
Start: 2019-06-07 | End: 2019-06-10

## 2019-06-07 RX ORDER — TRAMADOL HYDROCHLORIDE 50 MG/1
50 TABLET ORAL EVERY 4 HOURS PRN
Qty: 18 TABLET | Refills: 0 | Status: SHIPPED | OUTPATIENT
Start: 2019-06-07 | End: 2019-06-07 | Stop reason: HOSPADM

## 2019-06-07 RX ORDER — AMOXICILLIN AND CLAVULANATE POTASSIUM 875; 125 MG/1; MG/1
1 TABLET, FILM COATED ORAL EVERY 12 HOURS SCHEDULED
Status: DISCONTINUED | OUTPATIENT
Start: 2019-06-07 | End: 2019-06-07 | Stop reason: HOSPADM

## 2019-06-07 RX ORDER — HYDROCODONE BITARTRATE AND ACETAMINOPHEN 5; 325 MG/1; MG/1
1 TABLET ORAL ONCE
Status: COMPLETED | OUTPATIENT
Start: 2019-06-07 | End: 2019-06-07

## 2019-06-07 RX ADMIN — MORPHINE SULFATE 4 MG: 2 INJECTION, SOLUTION INTRAMUSCULAR; INTRAVENOUS at 06:21

## 2019-06-07 RX ADMIN — DIPHENHYDRAMINE HYDROCHLORIDE 25 MG: 50 INJECTION, SOLUTION INTRAMUSCULAR; INTRAVENOUS at 06:26

## 2019-06-07 RX ADMIN — ENOXAPARIN SODIUM 40 MG: 40 INJECTION SUBCUTANEOUS at 13:38

## 2019-06-07 RX ADMIN — MORPHINE SULFATE 4 MG: 2 INJECTION, SOLUTION INTRAMUSCULAR; INTRAVENOUS at 09:52

## 2019-06-07 RX ADMIN — HYDROCODONE BITARTRATE AND ACETAMINOPHEN 1 TABLET: 5; 325 TABLET ORAL at 13:24

## 2019-06-07 RX ADMIN — VANCOMYCIN HYDROCHLORIDE 1250 MG: 10 INJECTION, POWDER, LYOPHILIZED, FOR SOLUTION INTRAVENOUS at 04:07

## 2019-06-07 RX ADMIN — PIPERACILLIN SODIUM,TAZOBACTAM SODIUM 3.38 G: 3; .375 INJECTION, POWDER, FOR SOLUTION INTRAVENOUS at 01:14

## 2019-06-07 RX ADMIN — TRAMADOL HYDROCHLORIDE 50 MG: 50 TABLET, FILM COATED ORAL at 11:47

## 2019-06-07 RX ADMIN — AMOXICILLIN AND CLAVULANATE POTASSIUM 1 TABLET: 875; 125 TABLET, FILM COATED ORAL at 12:26

## 2019-06-07 ASSESSMENT — PAIN SCALES - GENERAL
PAINLEVEL_OUTOF10: 7
PAINLEVEL_OUTOF10: 0
PAINLEVEL_OUTOF10: 10
PAINLEVEL_OUTOF10: 9
PAINLEVEL_OUTOF10: 8
PAINLEVEL_OUTOF10: 10
PAINLEVEL_OUTOF10: 10

## 2019-06-07 ASSESSMENT — PAIN DESCRIPTION - ORIENTATION: ORIENTATION: LEFT

## 2019-06-07 ASSESSMENT — PAIN DESCRIPTION - LOCATION: LOCATION: LEG

## 2019-06-07 ASSESSMENT — PAIN DESCRIPTION - ONSET: ONSET: ON-GOING

## 2019-06-07 ASSESSMENT — PAIN DESCRIPTION - PAIN TYPE
TYPE: ACUTE PAIN
TYPE: ACUTE PAIN

## 2019-06-07 ASSESSMENT — PAIN DESCRIPTION - FREQUENCY: FREQUENCY: CONTINUOUS

## 2019-06-07 ASSESSMENT — PAIN DESCRIPTION - PROGRESSION: CLINICAL_PROGRESSION: NOT CHANGED

## 2019-06-07 ASSESSMENT — PAIN DESCRIPTION - DESCRIPTORS: DESCRIPTORS: ACHING

## 2019-06-07 NOTE — PROGRESS NOTES
Patient received discharge orders, iv removed. Ultram d/c'd duet to ineffectiveness. New order for Vicodin, given with benefit. Left lower leg with splint and ace wrap. Reviewed discharge instructions, patient and mother verbalized understanding. Demonstrated Lovenox injection, mother states she is confident she will be able to give. Emphasized importance of Lovenox shots and NWB. Walker given to patient. Wheelchair transport to ed where mother's car parked.

## 2019-06-07 NOTE — PROGRESS NOTES
Physical Therapy    Facility/Department: [facility] SURGERY  Initial Assessment / discharge    NAME: Dewayne Rubio  : 2000  MRN: [id number]    Date of Service: 2019    Discharge Recommendations: Dewayne Rubio scored a 22/24 on the AM-PAC short mobility form. At this time, no further PT is recommended upon discharge. Recommend patient returns to prior setting with prior services. PT Equipment Recommendations  Equipment Needed: Yes  Mobility Devices: Carter Mew: Rolling    Assessment   Body structures, Functions, Activity limitations: Decreased functional mobility   Assessment: Pt is below her functional baseline s/p L LE  irrigation of open wound with closure. Pt demonstrates good understanding and compliance with L LE NWB status. Amb distance limited due to pain when L LE in dependent position. Pt declines to try stairs - plans to scoot up steps. PT reviewed safe plan for scooting up stairs with pt - pt verbalized good understanding. States she lives alone but will have assist initially at d/c. Rec rolling walker. No further acute PT issues. Will sign off. Decision Making: Low Complexity  Patient Education: role of PT, use of call light, d/c planning, L LE NWB; pt demonstrates good understanding  REQUIRES PT FOLLOW UP: No       Patient Diagnosis(es): The primary encounter diagnosis was Laceration of left lower extremity, initial encounter. Diagnoses of Dog bite of ankle, initial encounter and Pregnancy as incidental finding were also pertinent to this visit. has no past medical history on file. has a past surgical history that includes Achilles tendon surgery (Left, 2019). Restrictions  Position Activity Restriction  Other position/activity restrictions: NWB L LE  Vision/Hearing  Vision: Within Functional Limits  Hearing: Within functional limits     Subjective  General  Chart Reviewed:  Yes  Additional Pertinent Hx: Admit  with animal bite; to OR  for irrigation of open wound with closure, left leg ; PMHx: none listed  Referring Practitioner: Triny aDi DPM  Diagnosis: animal bite  Subjective  Subjective: Pt found supine in bed, eyes closed but pt alert. Pt agreeable to PT on 2nd attempt (eating breakfast 1st attempt).   Pain Screening  Patient Currently in Pain: Yes(10/10 L LE pain while resting in bed, RN notified of pts request for pain meds)    Orientation  Orientation  Overall Orientation Status: Within Functional Limits  Social/Functional History  Social/Functional History  Lives With: Alone  Type of Home: House  Home Layout: Bed/Bath upstairs  Home Access: Stairs to enter with rails(10-15 + 5)  Bathroom Toilet: Standard  Bathroom Equipment: Grab bars in shower  Home Equipment: (none)  ADL Assistance: Independent  Homemaking Assistance: Independent  Ambulation Assistance: Independent  Transfer Assistance: Independent  Active : Yes  Type of occupation: works in security  Cognition        Objective          AROM RLE (degrees)  RLE AROM: WNL  AROM LLE (degrees)  LLE General AROM: hip/knee WNL; ankle immobilized  Strength RLE  Strength RLE: WNL  Strength LLE  Comment: WFL to maintain L LE NWB        Bed mobility  Supine to Sit: Independent  Sit to Supine: Independent  Scooting: Independent(scooting in bed and towards EOB)  Transfers  Sit to Stand: Modified independent  Stand to sit: Modified independent  Ambulation  Ambulation?: Yes  Ambulation 1  Device: Rolling Walker(pt prefers RW over SW)  Assistance: Stand by assistance  Quality of Gait: slow but steady with walker, good compliance with L LE NWB status, appropriate step length on R  Distance: 20 ft  Comments: distance limited 2* increased L LE pain during amb     Balance  Sitting - Static: Good  Sitting - Dynamic: Good  Standing - Static: Good(SBA to supervision with RW)  Standing - Dynamic: Good(SBA with RW)  Comments: good compliance with L LE NWB when standing      Plan   Plan  Times per week: d/c acute PT  Safety Devices  Type of devices: Bed alarm in place, Call light within reach, Left in bed, Nurse notified, Gait belt(ice offered for L LE - pt declines to try)    G-Code       OutComes Score                                                  AM-PAC Score  AM-PAC Inpatient Mobility Raw Score : 22 (06/07/19 0931)  AM-PAC Inpatient T-Scale Score : 53.28 (06/07/19 0931)  Mobility Inpatient CMS 0-100% Score: 20.91 (06/07/19 0931)  Mobility Inpatient CMS G-Code Modifier : CJ (06/07/19 0931)          Goals          Therapy Time   Individual Concurrent Group Co-treatment   Time In [address]         Time Out 0924         Minutes 19                 Timed Code Treatment Minutes:  0    Total Treatment Minutes:  [address], PT, DPT  [id number]

## 2019-06-07 NOTE — DISCHARGE SUMMARY
Patient ID: Que Roberson      Patient's PCP: No primary care provider on file. Admit Date: 6/5/2019     Discharge Date: 6/7/2019    Admitting Physician: Janett Car DPM    Discharge Physician: Natasha Potter DPM    Discharge Diagnoses:   Patient Active Problem List   Diagnosis    Blurred vision    Animal bite    Wound of left leg, initial encounter       Hospital Course:  Patient was seen at [facility] ED and transported to [facility] ED hours after being attacked by an unknown dog, receiving a near circumferential laceration about her ankle. She was determined to be pregnant in the ED. We attempted to take her to the OR June 5th day of admission but she was not agreeing to a popliteal block and staying awake. So she was admitted to the floor and put on IV abx and scheduled for the next day. On 6/6/19, the patient underwent I&D of the left leg, the tendons were all found to be in tact but stripped of their paratenon. The wounds were cleansed, debrided and closed with a penrose drain to the lateral incision. On 6/7/19, the penrose drain was pulled and the surgical dressing was changed. Patient and mother were instructed on the patients post-operative treatment course and instructions. Both patient and mother stated they understood and were in agreement with the treatment plan. On 6/7/19, the patient was discharged to home with physical condition and vital signs stable. Upon discharge, the patient received new prescriptions for lovenox, augmentin, and tramadol. The patient was instructed to keep the dressing and posterior splint DRY, CLEAN, AND INTACT and to schedule an appointment to follow up with Dr. Nas Guillaume in 1 week. During the patients stay, the internal medicine team, PT/OT, and social work were involved in the patients care.      Consults:  IP CONSULT TO PODIATRY  PHARMACY TO DOSE VANCOMYCIN  IP CONSULT TO HOSPITALIST  IP CONSULT TO SOCIAL WORK  IP CONSULT TO ANESTHESIOLOGY      Significant Diagnostic Studies:  XR ANKLE LEFT (MIN 3 VIEWS)   Final Result      Soft tissue defect as above. No underlying osseous abnormality. Treatments: surgery: Procedure(s) (LRB):  IRRIGATION OF OPEN WOUND WITH CLOSURE- LEFT LEG (Left)    Scheduled medications:      naloxone 0.4 mg PRN   diphenhydrAMINE 25 mg Q6H PRN   acetaminophen 500 mg Q6H PRN   docusate sodium 100 mg BID PRN   ondansetron 4 mg Q6H PRN   sodium chloride  Continuous   morphine 2 mg Q2H PRN   Or     morphine 4 mg Q2H PRN   vancomycin 1,250 mg Q8H   enoxaparin 40 mg Daily   piperacillin-tazobactam 3.375 g Q8H     PRN medications:     naloxone 0.4 mg PRN   diphenhydrAMINE 25 mg Q6H PRN   acetaminophen 500 mg Q6H PRN   docusate sodium 100 mg BID PRN   ondansetron 4 mg Q6H PRN   morphine 2 mg Q2H PRN   Or     morphine 4 mg Q2H PRN       Disposition: home    Discharged Condition: Stable    Follow Up: Podiatric Physician in one week    Discharge Medications:   Pastora Ledesma   Home Medication Instructions ALP:[id number]    Printed on:06/07/19 5614   Medication Information                      amoxicillin-clavulanate (AUGMENTIN) 875-125 MG per tablet  Take 1 tablet by mouth 2 times daily for 14 days             enoxaparin (LOVENOX) 40 MG/0.4ML injection  Inject 0.4 mLs into the skin daily             Prenatal Multivit-Min-Fe-FA (PRENATAL VITAMINS) 0.8 MG TABS  Take 1 tablet by mouth daily             traMADol (ULTRAM) 50 MG tablet  Take 1 tablet by mouth every 4 hours as needed for Pain for up to 3 days. Intended supply: 3 days.  Take lowest dose possible to manage pain                  Activity: NON weight bearing on the left    Diet: regular diet    Wound Care: keep wound clean and dry      Time Spent on discharge is more than 45 minutes    Signed:  Jenny Baca DPM  PGY-1, Pager #: [phone]

## 2019-06-07 NOTE — PLAN OF CARE
Problem: Pain:  Description  Pain management should include both nonpharmacologic and pharmacologic interventions. Goal: Control of acute pain  Description  Control of acute pain  6/7/2019 0300 by Anuja Esqueda RN  Note:   Pt has not complained of any pain throughout shift. Pt encouraged to use call light for paras pain needs. Will continue to monitor  6/6/2019 1617 by Lea Ireland RN  Outcome: Met This Shift     Problem: Falls - Risk of:  Goal: Will remain free from falls  Description  Will remain free from falls  6/7/2019 0300 by Anuja Esqueda RN  Note:   Pt remains free of falls. Pt is in bed with bed alarm on and bed in the lowest position call light within reach will continue to monitor  6/6/2019 1617 by Lea Ireland RN  Outcome: Met This Shift  Note:   Patient's bed in low position, wheels locked. Call light kept in reach. Bed alarm on. Hourly checks on needs. Safety maintained.

## 2019-06-07 NOTE — CARE COORDINATION
Case Management Discharge Assessment  DC order received, pt will DC home with parents, walker provided by [facility] [employer]  2019  [facility] Case Management Department    Patient: Jacklyn Monsalve  MRN: [id number] / : 2000  ACCT: [de-identified]          Admission Documentation  Attending Provider: Renea Lubin DPM  Admit date/time: 2019  6:48 PM  Status: Inpatient [101]  Diagnosis: Animal bite     Readmission within last 30 days:  no     Living Situation  Discharge Planning  Living Arrangements: Parent  Support Systems: Family Members, Parent  Potential Assistance Needed: N/A  Type of Home Care Services: None  Patient expects to be discharged to[de-identified] home  Expected Discharge Date: 19    Service Assessment:       Values / Beliefs  Do you have any ethnic, cultural, sacramental, or spiritual Jain needs you would like us to be aware of while you are in the hospital?: No    Advance Directives (For Healthcare)  Pre-existing DNR Comfort Care/DNR Arrest/DNI Order: No  Healthcare Directive: No, patient does not have an advance directive for healthcare treatment                        Pharmacy: [facility] meds to beds   Potential Assistance Purchasing Medications:  No  Does patient want to participate in local refill/meds to beds program?: Not Assessed    Notification completed in HENS/PAS? No     IMM  No       Discharge disposition: Home     LOC Home with parents with walker no other needs      Mode of Transport private car with parents      Senia and her family were provided with choice of provider; she and her family are in agreement with the discharge plan.       Care Transitions patient: Meg Escalante RN  [facility]  Case Management Department  Ph: [phone] Fax: [phone]

## 2019-06-07 NOTE — PROGRESS NOTES
ID Follow-up NOTE  RESIDENT NOTE - reviewed / edited, attending note at bottom    CC:   Animal bite, left   Antibiotics: vanc/zosyn    Admit Date: 6/5/2019  Hospital Day: 3    Subjective:     Patient was doing well this morning. Denies f/c/n/v/sob/cp. Plan on DC today       Objective:     Patient Vitals for the past 8 hrs:   BP Temp Temp src Pulse Resp SpO2   06/07/19 0255 99/62 98.8 °F (37.1 °C) Oral 82 17 93 %     I/O last 3 completed shifts: In: 133 [P.O.:720; I.V.:275]  Out: 800 [Urine:800]  No intake/output data recorded. EXAM:  GENERAL: No apparent distress. HEENT: Membranes moist, no oral lesion  NECK:  Supple  LUNGS: Clear b/l, no rales, no dullness  CARDIAC: RRR, no murmur appreciated  ABD:  + BS, soft / NT  EXT:  L leg with dressing   NEURO: No focal neurologic findings  PSYCH: Orientation, sensorium, mood normal  LINES:  Peripheral iv       Data Review:  Lab Results   Component Value Date    WBC 10.2 06/06/2019    HGB 10.6 (L) 06/06/2019    HCT 31.9 (L) 06/06/2019    MCV 85.9 06/06/2019     06/06/2019     Lab Results   Component Value Date    CREATININE <0.5 (L) 06/05/2019    BUN 6 (L) 06/05/2019     06/05/2019    K 4.1 06/05/2019     06/05/2019    CO2 21 06/05/2019       Hepatic Function Panel:   Lab Results   Component Value Date    ALKPHOS 60 06/05/2019    ALT 14 06/05/2019    AST 16 06/05/2019    PROT 6.9 06/05/2019    BILITOT <0.2 06/05/2019    LABALBU 3.9 06/05/2019       XR ANKLE LEFT (MIN 3 VIEWS)   Final Result       Soft tissue defect as above.  No underlying osseous abnormality.          Scheduled Meds:   vancomycin  1,250 mg Intravenous Q8H    enoxaparin  40 mg Subcutaneous Daily    piperacillin-tazobactam  3.375 g Intravenous Q8H       Continuous Infusions:   sodium chloride 75 mL/hr at 06/06/19 0758       PRN Meds:  naloxone, diphenhydrAMINE, acetaminophen, docusate sodium, ondansetron, morphine **OR** morphine      Assessment:     Animal bite wound, left lower extremity   S/p Wound wash out with closure (DOS 6/6)     Plan:     -Continue Vanc/zoysn  -Sx culture: NGTD  -Patient plan on DC today with augmentin    Discussed with Dr. Edin Daly. Zachery Howell DPM   PGY-2, Pager #: [phone]       Addendum to Resident Consult note:  Pt seen, examined and evaluated. I have reviewed the current history, physical findings, labs and assessment and plan and agree with note as documented by resident (Dr. Chris Julien).     18 yo woman - + pregnant     Dog bite L ankle, 'attacked'. She was walking her dog and other dog was going after her dog. She was pulling away her dog and dog bite her leg.       Presented / admit 6/5 - deep wounds. Afebrile. WBC 14.8. Tox + opiate, benzo. Seen by Podiatry. Refused surg on night of admission. Started on vancomycin + zosyn. OR today 6/6 - I&D with wound closure (penrose placed)  Tissue GS no WBC, no organism. Culture in process     IMP/  Dog bite, deep tissue.      - can consider 'provoked', see above, dog going after another dog   - Tetanus vaccine given 3/29/19    POD#1 debridement     REC/  Cont zosyn + vancomycin for now   F/u on cult     Wound care per podiatry     High risk for wound infection  Not candidate for home iv antibiotics  Home on po augmentin 875 mg bid x 14 days  Close f/u - see podiatry, call ID or podiatry with any change in wound     Discussed with pt and her mother   Kika Garcia MD

## 2019-06-09 NOTE — OP NOTE
Patient: Serina Sanchez  YOB: 2000  MRN: [id number]  Date of Procedure: 6/6/2019     Pre-Op Diagnosis: ANIMAL BITE LEFT LEG     Post-Op Diagnosis: Same       Procedure(s):  1) IRRIGATION OF OPEN WOUND WITH CLOSURE- LEFT LEG     Anesthesia: Regional, Monitor Anesthesia Care     Surgeon(s):  Charmayne Lust, [place]     Assistant:   Florencia Spatz, DPM      Estimated Blood Loss (mL): 50     Complications: None     Specimens:   ID Type Source Tests Collected by Time Destination   1 : LEFT LEG WOUND CULTURE Tissue Tissue TISSUE CULTURE Florencia Spatz, DPM 6/6/2019 1352           Implants:  * No implants in log *      Drains:   Open Drain Left;Posterior Leg  (Active)         Findings: No transverse tears to the achilles or peroneal tendons were found, longitudinal tears of achilles and peroneals were noted but not repaired due to contaminated nature of injury.           INDICATIONS FOR PROCEDURE: This patient has signs and symptoms clinically  consistent with the above mentioned preoperative diagnosis. Due to the nature of traumatic animal bites, and concern for the status of her tendons, it was determined that the patient would benefit from surgical intervention. All potential risks, benefits, and complications were discussed with the patient prior to the scheduling of surgery. All the patient's questions were answered and no guarantees were given. The patient wished to proceed with surgery, and informed written consent was obtained. DETAILS OF PROCEDURE: The patient was brought from the pre-operative area and placed on the operating table in the prone position. The left  lower extremity was then scrubbed, prepped, and draped in the usual sterile fashion. A time-out was performed. The patient, procedure, and operative site were confirmed, and the following procedure was performed. Operative procedure #1: Attention was directed to the patients left leg.  The posterior laceration was noted to be down to the level of achilles tendon, gently under continuous flushing using cysto tubing and a total of 12 L normal saline, the wound was flushed out. A pair of tenotomy scissors were used to free up the subcutaneous tissue until the tendon was clearly visualized. The same was done laterally and it was determined that an incision needed to be made to connect the two lacerations so appropriate debridement could be accomplished, this was performed with a #15 blade. The achilles was carefully inspected, and it was noted that while there was no transverse laceration, the were some longitudinal tears and the paratenon was stripped. The paratenon was carefully repaired with 3-0 vicryl. On the lateral side the peroneal tendon sheath was carefully opened using Metzenbaum scissors,  similarly the peroneals did not have transverse lacerations but longitudinal tears were noted. The tendon sheath was closed with 3-0 vicryl. Next the subcutaneous tissue was closed as well with 3-0 vicryl. The skin was approximated with 3-0 nylon in a simple interrupted fashion. At this time a soft sterile dressing was applied consisting of Betadine ointment, adaptic, gauze, cast padding, ace, then a posterior splint with sugar tong and another ace. END OF PROCEDURE: The patient tolerated the procedure and anesthesia well and was transported from the operating room to the PACU with vital signs stable and vascular status intact to all aspects of the patient's left lower extremity and digital capillary refill time immediate to the digits of the left  foot. Following a period of post-operative monitoring, the patient was transferred back to the floor for further monitoring and care.      Dictated on behalf of Dr. Madyson Pritchard, LOW   Podiatric Resident, PGY-2  Pager: [phone]  6/9/2019, 3:44 PM

## 2019-06-11 LAB
ANAEROBIC CULTURE: NORMAL
GRAM STAIN RESULT: NORMAL
WOUND/ABSCESS: NORMAL

## 2019-09-28 ENCOUNTER — HOSPITAL ENCOUNTER (EMERGENCY)
Age: 19
Discharge: LEFT BEFORE DISCHARGE | End: 2019-09-28
Attending: EMERGENCY MEDICINE
Payer: MEDICAID

## 2019-09-28 VITALS
WEIGHT: 170 LBS | HEIGHT: 63 IN | SYSTOLIC BLOOD PRESSURE: 109 MMHG | DIASTOLIC BLOOD PRESSURE: 69 MMHG | HEART RATE: 105 BPM | BODY MASS INDEX: 30.12 KG/M2 | OXYGEN SATURATION: 97 % | TEMPERATURE: 98.9 F | RESPIRATION RATE: 18 BRPM

## 2019-09-28 DIAGNOSIS — O46.90 VAGINAL BLEEDING IN PREGNANCY: ICD-10-CM

## 2019-09-28 DIAGNOSIS — M79.605 LEFT LEG PAIN: Primary | ICD-10-CM

## 2019-09-28 LAB
AMORPHOUS: ABNORMAL /HPF
BACTERIA: ABNORMAL /HPF
BILIRUBIN URINE: NEGATIVE
BLOOD, URINE: NEGATIVE
CLARITY: ABNORMAL
COLOR: YELLOW
EPITHELIAL CELLS, UA: ABNORMAL /HPF
GLUCOSE URINE: NEGATIVE MG/DL
HCG(URINE) PREGNANCY TEST: POSITIVE
KETONES, URINE: NEGATIVE MG/DL
LEUKOCYTE ESTERASE, URINE: ABNORMAL
MICROSCOPIC EXAMINATION: YES
NITRITE, URINE: NEGATIVE
PH UA: 7 (ref 5–8)
PROTEIN UA: NEGATIVE MG/DL
RBC UA: ABNORMAL /HPF (ref 0–2)
SPECIFIC GRAVITY UA: 1.01 (ref 1–1.03)
URINE TYPE: ABNORMAL
UROBILINOGEN, URINE: 0.2 E.U./DL
WBC UA: ABNORMAL /HPF (ref 0–5)

## 2019-09-28 PROCEDURE — 84703 CHORIONIC GONADOTROPIN ASSAY: CPT

## 2019-09-28 PROCEDURE — 99283 EMERGENCY DEPT VISIT LOW MDM: CPT

## 2019-09-28 PROCEDURE — 81001 URINALYSIS AUTO W/SCOPE: CPT

## 2019-09-28 ASSESSMENT — PAIN SCALES - GENERAL: PAINLEVEL_OUTOF10: 8

## 2019-09-28 ASSESSMENT — ENCOUNTER SYMPTOMS
EYES NEGATIVE: 1
RESPIRATORY NEGATIVE: 1

## 2020-01-21 ENCOUNTER — HOSPITAL ENCOUNTER (EMERGENCY)
Age: 20
Discharge: HOME | End: 2020-01-21
Attending: EMERGENCY MEDICINE
Payer: MEDICAID

## 2020-01-21 VITALS
WEIGHT: 199.8 LBS | BODY MASS INDEX: 35.39 KG/M2 | OXYGEN SATURATION: 100 % | RESPIRATION RATE: 14 BRPM | SYSTOLIC BLOOD PRESSURE: 103 MMHG | DIASTOLIC BLOOD PRESSURE: 68 MMHG | TEMPERATURE: 98.1 F | HEART RATE: 101 BPM

## 2020-01-21 PROCEDURE — 99283 EMERGENCY DEPT VISIT LOW MDM: CPT

## 2020-01-21 PROCEDURE — 99282 EMERGENCY DEPT VISIT SF MDM: CPT

## 2020-01-21 PROCEDURE — 6370000000 HC RX 637 (ALT 250 FOR IP): Performed by: EMERGENCY MEDICINE

## 2020-01-21 RX ORDER — CETIRIZINE HYDROCHLORIDE 10 MG/1
10 TABLET ORAL DAILY PRN
Qty: 30 TABLET | Refills: 1 | Status: SHIPPED | OUTPATIENT
Start: 2020-01-21 | End: 2020-02-05

## 2020-01-21 RX ORDER — PREDNISONE 20 MG/1
60 TABLET ORAL ONCE
Status: COMPLETED | OUTPATIENT
Start: 2020-01-21 | End: 2020-01-21

## 2020-01-21 RX ORDER — SULFAMETHOXAZOLE AND TRIMETHOPRIM 800; 160 MG/1; MG/1
1 TABLET ORAL 2 TIMES DAILY
Qty: 20 TABLET | Refills: 0 | Status: SHIPPED | OUTPATIENT
Start: 2020-01-21 | End: 2020-01-31

## 2020-01-21 RX ORDER — PREDNISONE 10 MG/1
TABLET ORAL
Qty: 20 TABLET | Refills: 0 | Status: SHIPPED | OUTPATIENT
Start: 2020-01-21 | End: 2020-01-31

## 2020-01-21 RX ADMIN — PREDNISONE 60 MG: 20 TABLET ORAL at 11:46

## 2020-01-21 ASSESSMENT — PAIN DESCRIPTION - LOCATION: LOCATION: HEAD

## 2020-01-21 ASSESSMENT — PAIN DESCRIPTION - DESCRIPTORS
DESCRIPTORS: BURNING
DESCRIPTORS: BURNING

## 2020-01-21 ASSESSMENT — PAIN SCALES - GENERAL
PAINLEVEL_OUTOF10: 10
PAINLEVEL_OUTOF10: 10

## 2020-01-21 ASSESSMENT — PAIN DESCRIPTION - PAIN TYPE
TYPE: ACUTE PAIN
TYPE: ACUTE PAIN

## 2020-01-21 ASSESSMENT — PAIN - FUNCTIONAL ASSESSMENT: PAIN_FUNCTIONAL_ASSESSMENT: 0-10

## 2020-01-21 NOTE — ED PROVIDER NOTES
TRIAGE CHIEF COMPLAINT:   Chief Complaint   Patient presents with    Allergic Reaction         HPI: Faizan Brand is a 22 y.o. female who presents to the Emergency Department with complaint of an allergic reaction to hair dye.  2 days ago the patient used black hair dye. Yesterday morning she noted some blistering and irritation of the scalp. This morning she noted swelling of the  forehead and periorbital areas right greater than left. Complains of some crusted drainage from the rash on her scalp. She does have some itching. No fever or chills. No chest pain or shortness of breath. Denies any swelling of the tongue or lips. No difficulty swallowing. Tetanus is up-to-date. REVIEW OF SYSTEMS:  6 systems reviewed. Pertinent positives per HPI. Otherwise noted to be negative. Nursing notes reviewed and agree with above. Past medical/surgical history reviewed. MEDICATIONS   Patient's Medications   New Prescriptions    CETIRIZINE (ZYRTEC) 10 MG TABLET    Take 1 tablet by mouth daily as needed for Allergies (Itching or rash)    PREDNISONE (DELTASONE) 10 MG TABLET    4 tabs po qam for 2 days then  3,2,1 tabs qam for 2 days each total of 8 days    SULFAMETHOXAZOLE-TRIMETHOPRIM (BACTRIM DS) 800-160 MG PER TABLET    Take 1 tablet by mouth 2 times daily for 10 days   Previous Medications    PRENATAL MULTIVIT-MIN-FE-FA (PRENATAL VITAMINS) 0.8 MG TABS    Take 1 tablet by mouth daily   Modified Medications    No medications on file   Discontinued Medications    No medications on file         ALLERGIES No Known Allergies      /68   Pulse 101   Temp 98.1 °F (36.7 °C) (Oral)   Resp 14   Wt 90.6 kg (199 lb 12.8 oz)   SpO2 100%   Breastfeeding Unknown Comment: had baby 12/23/2019  BMI 35.39 kg/m²   General:  No acute distress. Non toxic appearance  Head:   Normocephalic and atraumatic  Eyes:   Conjunctiva clear, CORRY, EOM's intact. Sclera anicteric.   Moderate to significant periorbital edema noted on the right with mild periorbital edema on the left. No chemosis. Visual acuity is normal for the patient. ENT:   Mucous membranes moist.  TMs are normal.  No swelling of the tongue or lips. She swallows easily. Oropharynx is clear. Neck:   Supple. No adenopathy or jugular venous distension  Lungs/Chest:  No respiratory distress  CVS:   Regular rate and rhythm  Abdomen:  Furred  Extremities:  Full range of motion  Skin:   She has some erythema and crusting of the scalp with minimal tenderness. No drainable abscess. Back:   Deferred  Neuro:  Alert and OX3. Speech clear and appropriate. No upper/lower extremity weakness. Normal sensation in all extremities. No facial asymmetry or weakness. Gait normal.  Psych:   Affect normal. Mood normal        RADIOLOGY:      LAB      ED COURSE / MDM:  58-year-old female with allergic dermatitis of the scalp from using hair dye 2 days ago now has crusting of the scalp with some evidence of secondary bacterial infection. Complains of itching. She has swelling of the forehead and periorbital areas right greater than left secondary to dependent edema from inflammation. No swelling of the tongue or lips. No respiratory distress, chest pain or shortness of breath. She was started on prednisone here. She was given a tapering course of prednisone by mouth as well as Zyrtec to take if needed for itching. She was given a prescription for Bactrim DS to take for 10 days for probable secondary cellulitis. I discussed with Darcy Tobar the results of evaluation in the Emergency Department, diagnosis, care and prognosis. The plan is to discharge to home. The patient is in agreement with the plan and questions have been answered. I also discussed with the patient and/or family the reasons which may require a return visit and the importance of follow-up care.        (Please note that portions of this note may have been completed with a voice recognition program.  Efforts

## 2022-05-06 NOTE — ED TRIAGE NOTES
3 pm today, pt was walking her dog and another dog came out her and her dog, pt sustained dog bite to left lower leg. Gaping lac. +swelling. Dog was unknown to pt. Police notified. Spoke with Mother, rescheduled for Monday at 4pm. Mother stated that they received the lupron just a few minutes ago but will not make it in time for appointment and wants to reschedule.

## 2023-01-06 NOTE — PROGRESS NOTES
Podiatric Surgery Daily Progress Note  Senia Huffman   1 Day Post-Op      Admit Date: 6/5/2019      Hospital day #: 2                            Code:Full Code    Chief Complaint: Animal Bite      Subjective :   Patient seen and examined this am at the bedside. Patient denies any acute overnight events. Patient feels her left LE dressing is too tight. Patient denies N/V/F/C/SOB. Patient denies calf pain, thigh pain, chest pain. Review of Systems: A 12 point review of symptoms is unremarkable with the exception of the chief complaint. Patient specifically denies nausea, fever, vomiting, chills, shortness of breath, chest pain, abdominal pain, constipation or difficulty urinating. Objective     BP 99/62   Pulse 82   Temp 98.8 °F (37.1 °C) (Oral)   Resp 17   Ht 5' 3\" (1.6 m)   Wt 168 lb (76.2 kg)   SpO2 93%   BMI 29.76 kg/m²      I/O:    Intake/Output Summary (Last 24 hours) at 6/7/2019 0847  Last data filed at 6/6/2019 2241  Gross per 24 hour   Intake 770 ml   Output 800 ml   Net -30 ml              Wt Readings from Last 3 Encounters:   06/05/19 168 lb (76.2 kg)   03/27/18 164 lb 8 oz (74.6 kg) (90 %, Z= 1.26)*   03/01/18 163 lb 12.8 oz (74.3 kg) (89 %, Z= 1.24)*     * Growth percentiles are based on CDC (Girls, 2-20 Years) data. LABS:    Recent Labs     06/05/19 1938 06/06/19 0628   WBC 14.8* 10.2   HGB 12.3 10.6*   HCT 37.5 31.9*    197        Recent Labs     06/05/19 1938      K 4.1      CO2 21   BUN 6*   CREATININE <0.5*        Recent Labs     06/05/19 1938 06/06/19 0628   PROT 6.9  --    INR  --  0.97           LOWER EXTREMITY EXAMINATION    Dressing to left LE intact. No strikethrough noted to the external dressing. Scant hemorrhagic drainage noted to the internal layers of the dressing. Penrose drain inplace and intact. VASCULAR:  DP/PT pulses palpable b/l. Minimal non-pitting edema noted to left LE. No streaking, no lymphangitis.      NEUROLOGIC:  Epicritic sensation intact b/l. Patient responds well to pain stimuli. DERMATOLOGIC:   Surgical incisions x2 noted to the left LE. Surgical incision #1 is present on the lateral aspect of the left lower leg. Incision is well-coapted with sutures intact. No dehiscence noted. No surrounding erythema noted. No malodor, fluctuance, or purulence noted. Penrose drain present. Surgical incision #2 is present on the posteromedial aspect of the left LE. Incision is well-coapted with sutures intact. No dehiscence noted. No surrounding erythema noted. No malodor, fluctuance, or purulence noted. MUSCULOSKELETAL:  Mild pain on compression of the left calf. Patient able to wiggle toes 1-5. Active firing of tendons noted when patient attempts ankle ROM, left. ASSESSMENT/PLAN  -s/p irrigation of open wound with closure, left leg (DOS 6/6/19)  -Full thickness lacerations x2, left LE, 2/2 animal bite (surgically closed)    -[facility]  -VSS, no leukocytosis noted   -Penrose drain removed at bedside this am without any further bleeding noted  -New surgical dressing applied consisting of betadine and adaptic to the incisions, followed by gauze, kerlix, cast padding, and ace with gentle compression to the knee  -Posterior splint and sugar tong splint reapplied and secured with ace bandage  -Patient is strict NON weight bearing on the left  -Discharge prescriptions for augmentin, lovenox, and tramadol in patients chart. Confirmed with pharmacy these are OK for patient to take while pregnant  -Nurse to demonstrate how to administer lovenox  -PT/OT consulted  -Patient discharged      DVT Prophylaxis: lovenox 40mg subQ qd  Diet:DIET GENERAL;  Code Status: Full Code  PT/OT: f/u eval  Dispo:Patient discharged. Post-op medication prescriptions signed and in patients chart.  Had in depth discussion with patient and mother on the importance of patient being compliant with her post-operative treatment course and post-operative medications. Both patient and mother stated they understood and were in agreement with the plan. Patient to follow up with Dr. Mackenzie Zambrano in 1 week. Patient seen at bedside this AM with Dr. Rox Pace. Vincent De La Vega, PGY1  Pager#: [phone]    Patient was seen and evaluated at bedside. Agree with residents assessment and treatment plan. Discussed with patient and her mother the importance of taking the entire course of her antibiotics as prescribed and taking her lovenox until she is ambulatory. Patient's response was that \"I can't swallow pills\". Emphasized the importance of completing her antibiotics as prescribed. Patients only response was \"what sorta pain pills are [facility] give me\"  Discussed with patient that due to her pregnancy the safest medication for pain is tylenol. Patient relates that she \"didn't care about that\" and requested stronger pain meds.    Rox Pace DPM Vaccine status unknown

## (undated) DEVICE — BANDAGE ROLL,100% COTTON, 8 PLY, LARGE: Brand: KERLIX

## (undated) DEVICE — STRL PENROSE DRAIN 18" X 1/2": Brand: CARDINAL HEALTH

## (undated) DEVICE — SOLUTION IV 1000ML 0.9% SOD CHL

## (undated) DEVICE — COVER,TABLE,HEAVY DUTY,77"X90",STRL: Brand: MEDLINE

## (undated) DEVICE — SPLNT ORTHO GLASS 4X15

## (undated) DEVICE — Z CONVERTED USE 2273232 BANDAGE COMPR W6INXL11YD E KNIT DBL SELF CLSR EZE-BAND

## (undated) DEVICE — PODIATRY PK

## (undated) DEVICE — COVER LT HNDL BLU PLAS

## (undated) DEVICE — E-Z CLEAN, NON-STICK, PTFE COATED, ELECTROSURGICAL BLADE ELECTRODE, 2.5 INCH (6.35 CM): Brand: EZ CLEAN

## (undated) DEVICE — SET IRRIG L94IN ID0.281IN W/ 4.5IN DST FLX CONN 2 LD ON OFF

## (undated) DEVICE — TURNOVER KIT RM INF CTRL TECH

## (undated) DEVICE — GLOVE,SURG,TRIUMPH MICRO,LTX,PF,7.5: Brand: MEDLINE

## (undated) DEVICE — SUTURE MCRYL SZ 4-0 L27IN ABSRB UD L19MM PS-2 1/2 CIR PRIM Y426H

## (undated) DEVICE — COVER,MAYO STAND,XL,STERILE: Brand: MEDLINE

## (undated) DEVICE — COTTON UNDERCAST PADDING,CRIMPED FINISH: Brand: WEBRIL

## (undated) DEVICE — SUTURE ETHLN SZ 3-0 L18IN NONABSORBABLE BLK FS-1 L24MM 3/8 663H

## (undated) DEVICE — ROLL COT W11.5INXL11FT 1LB HIGHLY ABSRB SURG GRD

## (undated) DEVICE — PLATE ES AD W 9FT CRD 2

## (undated) DEVICE — SUTURE VCRL SZ 3-0 L27IN ABSRB UD L26MM CT-2 1/2 CIR J232H

## (undated) DEVICE — GOWN,SIRUS,POLYRNF,BRTHSLV,LG,30/CS: Brand: MEDLINE

## (undated) DEVICE — SUTURE VCRL SZ 3-0 L27IN ABSRB UD L26MM SH 1/2 CIR J416H

## (undated) DEVICE — GLOVE SURG SZ 8 L12IN FNGR THK79MIL GRN LTX FREE